# Patient Record
Sex: FEMALE | Race: BLACK OR AFRICAN AMERICAN | Employment: FULL TIME | URBAN - METROPOLITAN AREA
[De-identification: names, ages, dates, MRNs, and addresses within clinical notes are randomized per-mention and may not be internally consistent; named-entity substitution may affect disease eponyms.]

---

## 2022-12-19 ENCOUNTER — OFFICE VISIT (OUTPATIENT)
Dept: SURGERY | Age: 42
End: 2022-12-19
Payer: MEDICAID

## 2022-12-19 ENCOUNTER — HOSPITAL ENCOUNTER (OUTPATIENT)
Dept: LAB | Age: 42
Discharge: HOME | End: 2022-12-19

## 2022-12-19 VITALS
WEIGHT: 257.8 LBS | DIASTOLIC BLOOD PRESSURE: 76 MMHG | TEMPERATURE: 97.1 F | HEIGHT: 65 IN | BODY MASS INDEX: 42.95 KG/M2 | SYSTOLIC BLOOD PRESSURE: 132 MMHG | OXYGEN SATURATION: 100 % | HEART RATE: 78 BPM

## 2022-12-19 DIAGNOSIS — K21.9 GASTROESOPHAGEAL REFLUX DISEASE, UNSPECIFIED WHETHER ESOPHAGITIS PRESENT: ICD-10-CM

## 2022-12-19 DIAGNOSIS — I10 PRIMARY HYPERTENSION: Primary | ICD-10-CM

## 2022-12-19 DIAGNOSIS — M54.50 CHRONIC LOW BACK PAIN, UNSPECIFIED BACK PAIN LATERALITY, UNSPECIFIED WHETHER SCIATICA PRESENT: ICD-10-CM

## 2022-12-19 DIAGNOSIS — E66.01 MORBID OBESITY WITH BMI OF 40.0-44.9, ADULT (HCC): ICD-10-CM

## 2022-12-19 DIAGNOSIS — G89.29 CHRONIC LOW BACK PAIN, UNSPECIFIED BACK PAIN LATERALITY, UNSPECIFIED WHETHER SCIATICA PRESENT: ICD-10-CM

## 2022-12-19 DIAGNOSIS — M17.0 ARTHRITIS OF BOTH KNEES: ICD-10-CM

## 2022-12-19 DIAGNOSIS — E66.01 MORBID OBESITY (HCC): ICD-10-CM

## 2022-12-19 PROBLEM — M54.9 CHRONIC BACK PAIN: Status: ACTIVE | Noted: 2022-12-19

## 2022-12-19 LAB — SENTARA SPECIMEN COL,SENBCF: NORMAL

## 2022-12-19 PROCEDURE — 99001 SPECIMEN HANDLING PT-LAB: CPT

## 2022-12-19 PROCEDURE — 99204 OFFICE O/P NEW MOD 45 MIN: CPT | Performed by: SPECIALIST

## 2022-12-19 PROCEDURE — 3078F DIAST BP <80 MM HG: CPT | Performed by: SPECIALIST

## 2022-12-19 PROCEDURE — 3074F SYST BP LT 130 MM HG: CPT | Performed by: SPECIALIST

## 2022-12-19 RX ORDER — KETOCONAZOLE 20 MG/ML
SHAMPOO, SUSPENSION TOPICAL
COMMUNITY
Start: 2022-12-01

## 2022-12-19 RX ORDER — VENLAFAXINE HYDROCHLORIDE 75 MG/1
1 CAPSULE, EXTENDED RELEASE ORAL DAILY
COMMUNITY
Start: 2022-07-19

## 2022-12-19 RX ORDER — CHOLECALCIFEROL (VITAMIN D3) 125 MCG
1 CAPSULE ORAL DAILY
COMMUNITY
Start: 2022-06-16

## 2022-12-19 RX ORDER — NYSTATIN 100000 U/G
CREAM TOPICAL 2 TIMES DAILY
COMMUNITY
Start: 2022-10-02

## 2022-12-19 RX ORDER — CYCLOBENZAPRINE HCL 10 MG
TABLET ORAL
COMMUNITY
Start: 2022-12-01

## 2022-12-19 RX ORDER — TRIAMCINOLONE ACETONIDE 1 MG/G
CREAM TOPICAL
COMMUNITY
Start: 2022-12-01

## 2022-12-19 RX ORDER — TOPIRAMATE 50 MG/1
1 TABLET, FILM COATED ORAL 2 TIMES DAILY
COMMUNITY
Start: 2022-09-02

## 2022-12-19 RX ORDER — TRAZODONE HYDROCHLORIDE 50 MG/1
TABLET ORAL
COMMUNITY
Start: 2022-12-01

## 2022-12-19 RX ORDER — CETIRIZINE HYDROCHLORIDE 10 MG/1
1 TABLET ORAL DAILY
COMMUNITY
Start: 2022-06-16

## 2022-12-19 RX ORDER — FLUTICASONE PROPIONATE 50 MCG
1 SPRAY, SUSPENSION (ML) NASAL DAILY
COMMUNITY

## 2022-12-19 RX ORDER — FAMOTIDINE 20 MG/1
20 TABLET, FILM COATED ORAL DAILY
COMMUNITY
Start: 2022-10-02

## 2022-12-19 NOTE — PROGRESS NOTES
Bariatric Surgery Consultation    Subjective: The patient is a 38 y.o. obese female with a Body mass index is 42.9 kg/m². .  The patient is at her heaviest weight for the past 5 years. she has been overweight since age 37.   she has been considering surgery since last year. she desires surgery at this time because of multiple health concerns and their lifestyle issues which are hindered by their weight. she has been referred by his family physician Dr Keiry Ulloa for evaluation and treatment of their obesity via surgical intervention. Ana Juárez has tried multiple diets in her lifetime most recently tried physician supervised, behavior modification, unsupervised diets, Weight Watchers, and Atkins    Bariatric comorbidities present are   Patient Active Problem List   Diagnosis Code    Morbid obesity ([facility] E66.01    Morbid obesity with BMI of 40.0-44.9, adult ([facility] [address] E66.01, Z68.41    Hypertension I10    Chronic back pain M54.9, G89.29    Arthritis of both knees M17.0    GERD (gastroesophageal reflux disease) K21.9       The patient is considering laparoscopic sleeve gastrectomy for surgical weight loss due to their ineffective progress with medical forms of weight loss and the urging of their physician who cares for their primary medical issues. The patient  now presents  for consideration for weight loss surgery understanding the benefits of this over a medical approach of weight loss as was discussed in our presentation on weight loss surgery. They have discussed their plans both with their family and primary care physician who is in support of their pursuit of such. The patient has not had health issues as of late and denies and gastrointestinal disturbances other than what is outlined below in their review of symptoms. All of their prior evaluations available by both their PCP's and specialists physicians have been reviewed today either in the Care Everywhere portal or scanned under the media tab.     I have spent a large portion of my initial consultation today reviewing the patients current dietary habits which have contributed to their health issues and obesity. I have suggested to them personally a dietary regimen that they can initiate now to help with their status as it pertains to their weight. They understand that the most important aspect of their journey through their weight loss endeavor will be their adherence to a new lifestyle of healthy eating behavior. They also understand that an adherence to an exercise program will not only help with weight loss but is ultimately important in weight maintenance. The patients goal weight is 150lb. These goals are consistent with expected outcomes of their desired operation. her Medical goals are resolution of these health issues. Patient Active Problem List    Diagnosis Date Noted    Morbid obesity ([facility] [address] 12/19/2022    Morbid obesity with BMI of 40.0-44.9, adult ([facility] 12/19/2022    Hypertension 12/19/2022    Chronic back pain 12/19/2022    Arthritis of both knees 12/19/2022    GERD (gastroesophageal reflux disease) 12/19/2022     Past Surgical History:   Procedure Laterality Date    HX HERNIA REPAIR      ventral hernia repair without mesh      Social History     Tobacco Use    Smoking status: Never    Smokeless tobacco: Not on file   Substance Use Topics    Alcohol use: No      Family History   Problem Relation Age of Onset    Dementia Mother     Cancer Father     Diabetes Sister     Hypertension Sister       Current Outpatient Medications   Medication Sig Dispense Refill    cetirizine (ZYRTEC) 10 mg tablet Take 1 Tablet by mouth daily. cholecalciferol, vitamin D3, 50 mcg (2,000 unit) tab Take 1 Tablet by mouth daily. cyclobenzaprine (FLEXERIL) 10 mg tablet       famotidine (PEPCID) 20 mg tablet Take 20 mg by mouth daily. fluticasone propionate (FLONASE) 50 mcg/actuation nasal spray 1 Spray daily.       ketoconazole (NIZORAL) 2 % shampoo nystatin (MYCOSTATIN) topical cream Apply  to affected area two (2) times a day. topiramate (TOPAMAX) 50 mg tablet Take 1 Tablet by mouth two (2) times a day. traZODone (DESYREL) 50 mg tablet       triamcinolone acetonide (KENALOG) 0.1 % topical cream       venlafaxine-SR (EFFEXOR-XR) 75 mg capsule Take 1 Capsule by mouth daily. promethazine (PHENERGAN) 25 mg tablet Take 25 mg by mouth every six (6) hours as needed for Nausea. HYDROcodone-acetaminophen (NORCO) 5-325 mg per tablet Take 1 Tab by mouth every eight (8) hours as needed for Pain. Max Daily Amount: 3 Tabs. 15 Tab 0    ondansetron (ZOFRAN ODT) 4 mg disintegrating tablet Take 1 Tab by mouth every eight (8) hours as needed for Nausea.  10 Tab 0     No Known Allergies       Review of Systems:       General - No history or complaints of unexpected fever, chills, or weight loss  Head/Neck - No history or complaints of headache, diplopia, dysphagia, hearing loss  Cardiac - No history or complaints of chest pain, palpitations, murmur, or shortness of breath  Pulmonary - No history or complaints of shortness of breath, productive cough, hemoptysis  Gastrointestinal - minimal reflux,no  abdominal pain, obstipation/constipation or blood per rectum  Genitourinary - No history or complaints of hematuria/dysuria, stress urinary incontinence symptoms, or renal lithiasis  Musculoskeletal - moderate joint pain in their back and knees,  no muscular weakness  Hematologic - No history or complaints of bleeding disorders,  No blood transfusions  Neurologic - No history or complaints of  migraine headaches, seizure activity, syncopal episodes, TIA or stroke  Integumentary - No history or complaints of rashes, abnormal nevi, skin cancer  Gynecological - carlotta menses               Objective:   Visit Vitals  /76   Pulse 78   Temp 97.1 °F (36.2 °C)   Ht 5' 5\" (1.651 m)   Wt 116.9 kg (257 lb 12.8 oz)   SpO2 100%   BMI 42.90 kg/m²       Physical Examination: General appearance - alert, well appearing, and in no distress and oriented to person, place, and time  Mental status - alert, oriented to person, place, and time, normal mood, behavior, speech, dress, motor activity, and thought processes  Eyes - pupils equal and reactive, extraocular eye movements intact, sclera anicteric, left eye normal, right eye normal  Ears - right ear normal, left ear normal  Nose - normal and patent, no erythema, discharge or polyps  Mouth - mucous membranes moist, pharynx normal without lesions  Neck - supple, no significant adenopathy  Lymphatics - no palpable lymphadenopathy, no hepatosplenomegaly  Chest - clear to auscultation, no wheezes, rales or rhonchi, symmetric air entry  Heart - normal rate, regular rhythm, normal S1, S2, no murmurs, rubs, clicks or gallops  Abdomen - soft, nontender, nondistended, no masses or organomegaly  Back exam - full range of motion, no tenderness, palpable spasm or pain on motion  Neurological - alert, oriented, normal speech, no focal findings or movement disorder noted  Musculoskeletal - no joint tenderness, deformity or swelling  Extremities - peripheral pulses normal, no pedal edema, no clubbing or cyanosis    Labs:       No results found for this or any previous visit (from the past 1440 hour(s)). Assessment:     Morbid obesity with comorbidity    Plan:     laparoscopic sleeve gastrectomy possible gastric bypass    This is a 38 y.o. female with a BMI of Body mass index is 42.9 kg/m². and the weight-related co-morbidties as noted below. Karyn Gonzalez meets the NIH criteria for bariatric surgery based upon the BMI of Body mass index is 42.9 kg/m². and multiple weight-related co-morbidties. Karyn Gonzalez has elected laparoscopic sleeve gastrectomy as her intervention of choice for treatment of morbid obestiy through surgical means secondary to its safety profile, rapid return to work  and decreases in operative risks over gastric bypass.     In the office today, following Sugar's history and physical examination, a 30 minute discussion regarding the anatomic alterations for the laparoscopic sleeve gastrectomy was undertaken. The dietary expectations and the patient and physician dependent factors for success were thoroughly discussed, to include the need for interval follow-up and long-term dietary changes associated with success. The possible complications of the sleeve gastrectomy  were also discussed, to include;death, DVT/PE, staple line leak, bleeding, stricture formation, infection, nutritional deficiencies and sleeve dilation. Specific weight related outcomes for success were also discussed with an emphasis on careful and close follow-up with the first year and eating behavior modification as the baseline and cyclical hunger return. The patient expressed an understanding of the above factors, and her questions were answered in their entirety. In addition, the patient watched a seminar regarding obesity, surgical weight loss including, adjustable gastric band, gastric bypass, and sleeve gastrectomy. This discussion contrasted the different surgical techniques, mechanisms of actions and expected outcomes, and surgical and medical risks associated with each procedure. In office today we had a long question and answer session where each questions was answered until there were no additional questions. Today, the patient had all of her questions answered and desires to proceed with  bariatric surgery initially choosing sleeve gastrectomy as her surgical option. Secondary Diagnoses:     Dietary Intervention  - The patient is currently scheduled to see or has been followed by a bariatric nutritionist for an attempt at preoperative weight loss as has been dictated by their insurance carrier.   They will be assessed at various times during their follow up to evaluate their progress depending on the length of time that is required once again by their carrier. I have explained the importance of preoperative weight loss and the benefits regarding lower surgical risk and also assisting the patient in reaching their weight loss goal. They understand also that they should participate in an exercise program to assist in this weight loss. Finally they understand there is a physiologic benefit from the standpoint of hepatic volume reduction and reduction of central visceral adiposity preoperatively. I have reiterated the importance of a low carbohydrate and high protein regimen to achieve their stated goal. I have reviewed their current eating behavior prior to this encounter and explained to them in an exhaustive fashion the appropriate diet that they should adhere to. They have been encouraged to loose weight pre operatively and understand it is our prerogative to cancel surgery or postpone their procedure in the event of significant weight gain. GERD -The patient understands that weight loss surgery is not a guaranteed cure for reflux disease but does understand the benefits that weight loss can have on reflux disease. They also understand that at the time of surgery the gastroesophageal junction will be evaluated for the presence of a diaphragmatic hernia. Hernias will be corrected always with the surgical procedure if possible and is deemed safe. The patient also understands that neither weight loss surgery nor repair of a diaphragmatic hernia repair guarantees the complete cessation of the disease. They also understand there is a possibility of recurrence of these hernias with a simple crural repair as is performed with these procedures. They understand they may have to continue their medications in the postoperative period.  They have a good understanding that the gastric bypass procedure is better suited to total resolution of this issue and that neither the Lap Band or sleeve gastrectomy is considered a curative procedure as it pertains to this diagnosis. Hypertension - the patient understands, and we have discussed in detail, that this is an active acute health problem that has a multifactorial effect on their body from the standpoint of healing. They understand that uncontrolled hypertension affects other organ processes and this leads to risk associated with surgical procedures. They understand that in addition to hypertension, once they develop other health issues, their risk is exponentially worse. Currently they understand that this likely one of the single most important items that they need to control in the perioperative process. They understand that the hospitals protocol is that patients entering the operative suite should have a blood pressure reading less than 160/90 prior to surgery. Elevated readings today in office above 160/90 are recommended emergent PCP follow-up or if symptomatic to proceed to the ED. The patient has a clear understanding of how weight loss improves hypertension as a whole, but also they understand that there is a significant genetic component to this disease process. We will monitor the patients blood pressure while in the hospital and the plan would be to continue those medications postoperatively. If a diuretic is being used we will stop them on discharge to prevent dehydration particularly with the sleeve gastrectomy and the gastric bypass procedures. They will be instructed to monitor their blood pressure postoperatively while at home and notify their primary care physician in the event of any significantly high or uncharacteristic readings. Weight Related Arthritis -The patient understands the benefits that weight loss surgery can have on their arthritis but also understands that weight loss is not a guaranteed cure and relief of symptoms is often dependent on the severity of the underlying disease.   The patient also understands that traditional pharmaceutical treatments for this diagnosis are usually unavailable to post-operative weight loss patients due to the effects on the gastrointestinal tract particularly with the gastric bypass and to a lesser effect with the sleeve gastrectomy. Any changes to the patients medication treatment will ultimately be made the patients PCP with input by our office.      Signed By: Gwyndolyn Mohs, MD     December 19, 2022

## 2022-12-20 LAB
A-G RATIO,AGRAT: 1.1 RATIO (ref 1.1–2.6)
ABSOLUTE LYMPHOCYTE COUNT, 10803: 1.9 K/UL (ref 1–4.8)
ALBUMIN SERPL-MCNC: 3.9 G/DL (ref 3.5–5)
ALP SERPL-CCNC: 124 U/L (ref 25–115)
ALT SERPL-CCNC: 18 U/L (ref 5–40)
ANION GAP SERPL CALC-SCNC: 11 MMOL/L (ref 3–15)
AST SERPL W P-5'-P-CCNC: 22 U/L (ref 10–37)
AVG GLU, 10930: 125 MG/DL (ref 91–123)
BASOPHILS # BLD: 0.1 K/UL (ref 0–0.2)
BASOPHILS NFR BLD: 1 % (ref 0–2)
BILIRUB SERPL-MCNC: 0.1 MG/DL (ref 0.2–1.2)
BUN SERPL-MCNC: 12 MG/DL (ref 6–22)
CALCIUM SERPL-MCNC: 9.5 MG/DL (ref 8.4–10.5)
CHLORIDE SERPL-SCNC: 105 MMOL/L (ref 98–110)
CO2 SERPL-SCNC: 25 MMOL/L (ref 20–32)
CREAT SERPL-MCNC: 0.7 MG/DL (ref 0.5–1.2)
EOSINOPHIL # BLD: 0.2 K/UL (ref 0–0.5)
EOSINOPHIL NFR BLD: 3 % (ref 0–6)
ERYTHROCYTE [DISTWIDTH] IN BLOOD BY AUTOMATED COUNT: 16.6 % (ref 10–15.5)
FERRITIN SERPL-MCNC: 50 NG/ML (ref 10–291)
GLOBULIN,GLOB: 3.5 G/DL (ref 2–4)
GLOMERULAR FILTRATION RATE: >60 ML/MIN/1.73 SQ.M.
GLUCOSE SERPL-MCNC: 100 MG/DL (ref 70–99)
GRANULOCYTES,GRANS: 53 % (ref 40–75)
HBA1C MFR BLD HPLC: 6 % (ref 4.8–5.6)
HCT VFR BLD AUTO: 36.6 % (ref 35.1–46.5)
HGB BLD-MCNC: 10.3 G/DL (ref 11.7–15.5)
IRON,IRN: 46 MCG/DL (ref 30–160)
LYMPHOCYTES, LYMLT: 33 % (ref 20–45)
MCH RBC QN AUTO: 24 PG (ref 26–34)
MCHC RBC AUTO-ENTMCNC: 28 G/DL (ref 31–36)
MCV RBC AUTO: 84 FL (ref 80–99)
MONOCYTES # BLD: 0.6 K/UL (ref 0.1–1)
MONOCYTES NFR BLD: 10 % (ref 3–12)
NEUTROPHILS # BLD AUTO: 3 K/UL (ref 1.8–7.7)
PLATELET # BLD AUTO: 263 K/UL (ref 140–440)
PMV BLD AUTO: 10.9 FL (ref 9–13)
POTASSIUM SERPL-SCNC: 4.1 MMOL/L (ref 3.5–5.5)
PROT SERPL-MCNC: 7.4 G/DL (ref 6.4–8.3)
RBC # BLD AUTO: 4.36 M/UL (ref 3.8–5.2)
SODIUM SERPL-SCNC: 141 MMOL/L (ref 133–145)
T4 FREE SERPL-MCNC: 0.9 NG/DL (ref 0.9–1.8)
TSH SERPL DL<=0.005 MIU/L-ACNC: 1.07 MCU/ML (ref 0.27–4.2)
WBC # BLD AUTO: 5.7 K/UL (ref 4–11)

## 2023-01-11 ENCOUNTER — HOSPITAL ENCOUNTER (OUTPATIENT)
Dept: BARIATRICS/WEIGHT MGMT | Age: 43
Discharge: HOME | End: 2023-01-11

## 2023-01-11 ENCOUNTER — HOSPITAL ENCOUNTER (OUTPATIENT)
Age: 43
Setting detail: OUTPATIENT SURGERY
Discharge: HOME | End: 2023-01-11
Attending: SPECIALIST | Admitting: SPECIALIST
Payer: MEDICAID

## 2023-01-11 ENCOUNTER — APPOINTMENT (OUTPATIENT)
Dept: GENERAL RADIOLOGY | Age: 43
End: 2023-01-11
Attending: SPECIALIST
Payer: MEDICAID

## 2023-01-11 VITALS
RESPIRATION RATE: 18 BRPM | BODY MASS INDEX: 42.57 KG/M2 | OXYGEN SATURATION: 99 % | HEART RATE: 62 BPM | SYSTOLIC BLOOD PRESSURE: 119 MMHG | HEIGHT: 65 IN | DIASTOLIC BLOOD PRESSURE: 66 MMHG | WEIGHT: 255.5 LBS | TEMPERATURE: 98.8 F

## 2023-01-11 VITALS — BODY MASS INDEX: 42.38 KG/M2 | HEIGHT: 65 IN | WEIGHT: 254.4 LBS

## 2023-01-11 DIAGNOSIS — K21.9 GASTROESOPHAGEAL REFLUX DISEASE, UNSPECIFIED WHETHER ESOPHAGITIS PRESENT: Primary | ICD-10-CM

## 2023-01-11 DIAGNOSIS — E66.01 MORBID OBESITY (HCC): ICD-10-CM

## 2023-01-11 PROCEDURE — 74240 X-RAY XM UPR GI TRC 1CNTRST: CPT

## 2023-01-11 PROCEDURE — 74011000250 HC RX REV CODE- 250: Performed by: SPECIALIST

## 2023-01-11 PROCEDURE — 76040000019: Performed by: SPECIALIST

## 2023-01-11 NOTE — PROCEDURES
Patient:Sugar Pelaez   : 1980  Medical Record [id number]    PREPROCEDURE DIAGNOSIS: This patient is preoperative for laparoscopic sleeve gastrectomy procedure with a history of  reflux disease. POSTPROCEDURE DIAGNOSIS: This patient is preoperative for laparoscopic sleeve gastrectomy procedure with a history of  reflux disease. PROCEDURES PERFORMED: Upper GI study with barium. ESTIMATED BLOOD LOSS: None. SPECIMENS: None. STATEMENT OF MEDICAL NECESSITY: The patient is a patient with a  longstanding history of obesity. They are now considering the laparoscopic sleeve gastrectomy procedure as a means of surgical weight control and due to their history of reflux disease and are being assessed preoperatively for such. DESCRIPTION OF PROCEDURE: The patient was brought to the fluoroscopy unit and  was given thin barium. On swallowing of barium, they were noted to have  normal peristalsis of their esophagus. They had prompt filling of distal  esophagus with tapering into the gastroesophageal junction. There was no evidence of a hiatal hernia present. Contrast then filled the gastric cardia, fundus,body and pre pyloric region with no abnormalities noted. Contrast then exited the pylorus in normal fashion. No obstruction was noted. There was no evidence of reflux noted.     (normal anatomy)    Den Morfin MD

## 2023-01-11 NOTE — PROGRESS NOTES
Medical Weight Loss Multi-Disciplinary Program    Patient's Name: Rogelio Howard Age: 38 y.o. YOB: 1980 Sex: female     Session #{NUMBERS [id number]:[id number]}. Pt attended in-person class. Weight obtained in office. Date: 1/11/2023    Visit Vitals   5' 5\" (1.651 m)   Wt 115.4 kg (254 lb 6.4 oz)   BMI 42.33 kg/m²       Pounds Lost since last month: N/A Pounds Gained since last month: N/A       Starting Weight (Initial Consult/Seminar***, ***: *** lbs Previous Months Weight: N/A   Overall Pounds Lost: *** lbs  Overall Pounds Gained: *** lbs     Do you smoke? ***    Alcohol intake:  Number of drinks per week: ***    Class Guidelines    Guidelines are reviewed with patient at the start of every class. 1. Patient understands that weight loss trial classes must be consecutive. Patient understands if they miss a class, it is their responsibility to contact me to reschedule class. I will reach out to patient after their first no show. 2.  Patient understands the expectations that weight maintenance/weight loss is expected during the classes. Failure to demonstrate changes may result in extension of weight loss trial, followed by returning to see the surgeon. Patient understands that they CANNOT gain any weight during the weight loss trial.  Gaining weight will result in extension of weight loss trial.  3. Patient is also instructed to complete their labwork, psychological evaluation visit, and any other tests that the surgeon has ordered while they are working on their weight loss trial.  4.  Patient was instructed to bring their packet of nutrition education materials to every class and appointment. Eating Habits and Behaviors    Reviewed intake  Understanding low-carbohydrate/low-sugar/low-fat, higher protein meals  Instruction given for personal dietary changes  Discussed perceived compliance    Comments: RD Reviewed Diet History and Physical Activity/Exercise habits.   Recommended dietary changes discussed for both before and after surgery. Today in class we reviewed the Key Diet Principles. Patient was encouraged to consume 3 meals each day, and meal timing was reviewed. Meal time behaviors that will help pt to be successful with their weight loss efforts were also discussed. We discussed the importance of drinking adequate amounts of fluids, recommending that patient consume a minimum of 64 oz of sugar-free, caffeine-free and carbonation-free fluids each day. Patient was encouraged to eliminate sugar-sweetened beverages such as sweet tea, fruit juice, fruit smoothies, flavored coffee drinks and regular sodas. During the weight loss trial, patient is encouraged to focus on eating protein-forward meals, with a daily goal of  grams protein. Patient was also advised to decrease carbohydrate intake to <100 g/d, choosing complex vs. simple carbohydrates in limited amounts. We also discussed limiting fat intake. Encouraged patient to follow the \"3-gram rule\" when choosing foods by looking for items containing <3 g of fat and sugar per serving. We reviewed meal planning guidelines and discussed appropriate meal and snack options. We also talked about appropriate protein drinks and patient was encouraged to start trying these, using them either for a meal replacement or a protein-rich snack pre-operatively. We reviewed the nutritional guidelines for selecting protein shakes. Pre-operative vitamin and mineral supplementation was reviewed. Patient was instructed to choose a chewable complete multivitamin with iron in NON-gummy form. Selection of probiotic was also reviewed. We also talked about dining out after bariatric surgery. Patient was instructed on:   Following the Key Diet Principles to stay within the bariatric dietary guidelines  Key words to look for in menu item descriptions in order to make healthier choices  Requesting specific substitutions to allow meals to fit in with bariatric dietary guidelines  Using the restaurant card to request smaller portions of menu items or ordering from children's/senior's menu    Patient's current diet habits include:  Patient is eating *** meals and *** snacks per day. Per dietary recall, pts diet has the following concerns:  Pt has found *** protein supplement shakes for use post-op in meeting their protein needs. ***     Patient's plan for dietary and/or behavior changes in the upcoming month: ***    Physical Activity/Exercise    Comments: We talked about exercise. Patient was given reasons of why exercise is so important and how that can help with their long-term success. I have encouraged patient to get a support system to help with the activity. We talked about recommended types of physical activity, including walking, swimming, cycling, or chair exercises. I also talked with patient about doing some strength training, which helps the metabolism, as well as strengthen muscles and bones. Patient's plan to incorporate more activity includes: ***      Behavior Modification     Comments:  During today's class, we discussed important behavior changes relating to dining away from home that will help the patient be successful in meeting their weight loss goals and improving their health including:   The need to limit the frequency of dining away from home, especially fast food and convenience food options, in order to limit intake of calories, fats, and carbohydrates  The importance of keeping a positive mindset by focusing on the occasion and enjoying the company rather than the foods they can't have and the need for reduced portion sizes after surgery  Avoiding starters such as appetizers, breads, bar snacks, soups, and salads  Researching to find restaurants that offer healthier food options and reviewing menus/nutrition facts online to have a plan of what to order before dining out    Class participants watched a video of a former patient's success story, where personal accountability and encouragement to make the appropriate lifestyle changes for improved of quality of life were discussed. Goals: Work to increase to 3-4 small meals per day, with 1-2 planned snacks as needed. Recommend following the plate method for meal planning - focusing on lean protein, non-starchy vegetables, and measured amounts of complex carbohydrates. - Goal of  g protein and <100 g carbohydrates per day. - Select at least 2 DIFFERENT protein supplements that can be used for protein supplementation to meet goals pre- and post-operatively. - Practice Carbohydrate Counting and label reading.   - Follow 3 g rule for fat and sugar. 2. Slow down meals  - Chew each bite 25-35 times. - Aim for 20-30 min/meal.  3. Increase non-caloric fluids to 64 oz per day. Eliminate caffeine, added sugar, carbonation, and straws.               - Continue to work to decrease sugar sweetened beverages - goal of calorie-free beverages only. - Must eliminate caffeine prior to surgery and avoid for ~6-8 weeks. - Practice 30:30 rule,  food and fluid intake. 4. Start activity regimen, work to increase ADLs. 5. Start Complete MVI and probiotic at least 30 days pre-op. Candidate for surgery (per RD): PENDING, Pt scheduled for nutrition education on ***.

## 2023-02-07 ENCOUNTER — HOSPITAL ENCOUNTER (OUTPATIENT)
Dept: BARIATRICS/WEIGHT MGMT | Age: 43
Discharge: HOME | End: 2023-02-07

## 2023-02-07 VITALS — HEIGHT: 65 IN | BODY MASS INDEX: 42.28 KG/M2 | WEIGHT: 253.8 LBS

## 2023-02-07 NOTE — PROGRESS NOTES
Medical Weight Loss Multi-Disciplinary Program    Patient's Name: Deepak Bowers Age: 38 y.o. YOB: 1980 Sex: female     Session #2. Pt attended in-person class. Weight obtained in office. Date: 2/7/2023    Visit Vitals  Ht 5' 5\" (1.651 m)   Wt 115.1 kg (253 lb 12.8 oz)   BMI 42.23 kg/m²       Pounds Lost since last month: 0.6 lbs Pounds Gained since last month: 0 lbs       Starting Weight: 257.8 lbs Previous Months Weight: 254.4 lbs   Overall Pounds Lost: 4 lbs  Overall Pounds Gained: 0 lbs       Do you smoke? no    Alcohol intake:  Number of drinks per week:  0    Class Guidelines    Guidelines are reviewed with patient at the start of every class. 1. Patient understands that weight loss trial classes must be consecutive. Patient understands if they miss a class, it is their responsibility to contact me to reschedule class. I will reach out to patient after their first no show. 2.  Patient understands the expectations that weight maintenance/weight loss is expected during the classes. Failure to demonstrate changes may result in extension of weight loss trial, followed by returning to see the surgeon. Patient understands that they CANNOT gain any weight during the weight loss trial.  Gaining weight will result in extension of weight loss trial.  3. Patient is also instructed to complete their labwork, psychological evaluation visit, and any other tests that the surgeon has ordered while they are working on their weight loss trial.  4.  Patient was instructed to bring their packet of nutrition education materials to every class and appointment. Eating Habits and Behaviors    Reviewed intake  Understanding low carbohydrates, low sugar, higher protein meals  Instruction given for personal dietary changes  Discussed perceived compliance    Comments: RD Reviewed Diet History and Physical Activity/Exercise habits.   Recommended dietary changes discussed for both before and after surgery. Today in class we reviewed the Key Diet Principles. Patient was encouraged to consume 3 meals each day, and meal timing was reviewed. Meal time behaviors that will help pt to be successful with their weight loss efforts were also discussed. We discussed the importance of drinking adequate amounts of fluids, recommending that patient consume a minimum of 64 oz of sugar-free, caffeine-free and carbonation-free fluids each day. Patient was encouraged to eliminate sugar-sweetened beverages such as sweet tea, fruit juice, fruit smoothies, flavored coffee drinks and regular sodas. During the weight loss trial, patient is encouraged to focus on eating protein-forward meals, with a daily goal of  grams protein. Patient was also advised to decrease carbohydrate intake to <100 g/d, choosing complex vs. simple carbohydrates in limited amounts. We also discussed limiting fat intake. Encouraged patient to follow the \"3-gram rule\" when choosing foods by looking for items containing <3 g of fat and sugar per serving. We reviewed meal planning guidelines and discussed appropriate meal and snack options. We also talked about appropriate protein drinks and patient was encouraged to start trying these, using them either for a meal replacement or a protein-rich snack pre-operatively. We reviewed the nutritional guidelines for selecting protein shakes. Pre-operative vitamin and mineral supplementation was reviewed. Patient was instructed to choose a chewable complete multivitamin with iron in NON-gummy form. Selection of probiotic was also reviewed. Comments: During today's class we talked about setting SMART goals. Patient was instructed on: The Stages of Readiness to Change  Goals for each stage of readiness to change. Key words associated with each stage of readiness to change. Examples of aspects of pts diet to focus on and how to tailor the patients goals in these areas.   Patient was given the guidelines of how to set SMART goals. Pt was given examples of barriers to change and ways to persist in the face of barriers. Patient's current diet habits include:  Patient is eating 3 meals and 0-1 snacks per day. Per dietary recall, pts diet has the following concerns:  Pt has found 0 protein supplement shakes for use post-op in meeting their protein needs. Pt is consuming foods high in carbohydrates, such as: cereal, rice, ice cream.  Pt is consuming foods high in fat such as: ice cream.  Pt is consuming 1 sugar-sweetened beverage daily. Patient's plan for dietary and/or behavior changes in the upcoming month: none noted    Physical Activity/Exercise    Comments: We talked about exercise. Patient was given reasons of why exercise is so important and how that can help with their long-term success. I have encouraged patient to get a support system to help with the activity. We talked about recommended types of physical activity, including walking, swimming, cycling, or chair exercises. I also talked with patient about doing some strength training, which helps the metabolism, as well as strengthen muscles and bones. Patient's plan to incorporate more activity includes: \"walking\". Behavior Modification     Comments:  During today's class, I discussed vitamin and mineral supplementation essential for health and prevention of malnutrition in depth. Patient was directed to the handouts on vitamins and minerals found on pages 27-33 that were provided in class handouts packet as well as a handout titled, \"Nutrient Deficiency and it's symptoms\". I reviewed the vitamins and minerals that need to be supplemented, dosage recommendations, functions of supplements and signs of deficiencies, as well as examples of specific supplements.   Reviewed briefly the requirement  differences between laparoscopic gastric bypass, laparoscopic sleeve gastrectomy, and lap-band procedures, while encouraging all patients to continue supplements for life no matter what bariatric surgery they are preparing for. Goals: Work to increase to 3-4 small meals per day, with 1-2 planned snacks as needed. Recommend following the plate method for meal planning - focusing on lean protein, non-starchy vegetables, and measured amounts of complex carbohydrates. - Goal of  g protein and <100 g carbohydrates per day. - Select at least 2 DIFFERENT protein supplements that can be used for protein supplementation to meet goals pre- and post-operatively. - Practice Carbohydrate Counting and label reading.   - Follow 3 g rule for fat and sugar. 2. Slow down meals  - Chew each bite 25-35 times. - Aim for 20-30 min/meal.  3. Increase non-caloric fluids to 64 oz per day. Eliminate caffeine, added sugar, carbonation, and straws.               - Continue to work to decrease sugar sweetened beverages - goal of calorie-free beverages only. - Must eliminate caffeine prior to surgery and avoid for ~6-8 weeks. - Practice 30:30 rule,  food and fluid intake. 4. Start activity regimen, work to increase ADLs. 5. Start Complete MVI and probiotic at least 30 days pre-op. Candidate for surgery (per RD): PENDING, Pt scheduled for nutrition education on 3/7/23.

## 2023-03-07 ENCOUNTER — HOSPITAL ENCOUNTER (OUTPATIENT)
Facility: HOSPITAL | Age: 43
Discharge: HOME | End: 2023-03-10

## 2023-03-07 VITALS — BODY MASS INDEX: 42.84 KG/M2 | HEIGHT: 65 IN | WEIGHT: 257.1 LBS

## 2023-03-07 NOTE — PROGRESS NOTES
Medical Weight Loss Multi-Disciplinary Program    Patient's Name: Nathen Christine  Age: 38 y.o. YOB: 1980   Sex: female    Session# 3 . Pt attended in-person class. Weight obtained in office. Date: 3/7/2023    Vitals:    03/07/23 1545   Weight: 257 lb 1.6 oz (116.6 kg)   Height: 5' 5\" (1.651 m)      Body mass index is 42.78 kg/m². Pounds Lost since last month: 0 lbs        Pounds Gained since last month: 3.3 lbs    Starting Weight: 257.8 lbs    Previous Months Weight: 253.8 lbs  Overall Pounds Lost: 0.7 lbs    Overall Pounds Gained: 0 lbs    Do you smoke? Yes, \"weed sometimes for pain\"    Alcohol intake:  Number of drinks  per week: 0    Class Guidelines    Guidelines are reviewed with patient at the start of every class. 1. Patient understands that weight loss trial classes must be consecutive. Patient understands if they miss a class, it is their responsibility to contact me to reschedule class. I will reach out to patient after their first no show. 2.  Patient understands the expectations that weight maintenance/weight loss is expected during the classes. Failure to demonstrate changes may result in one extra month of weight loss trial, followed by going back to see the surgeon. Patient understands that they CAN NOT gain any weight during the weight loss trial.  Gaining weight will result in extra classes. 3. Patient is also instructed to be doing their labs, blood work, psych visit, support group and any other test that the surgeon has used while they are working on their weight loss trial.  4.  Patient was instructed to bring their blue folder to every class and appointment. Eating Habits and Behaviors    Today in class, we reviewed the key diet principles. Pt was encouraged to consume 3 meals each day; the timing the meals was reviewed. Meal time behaviors that will help pt to be successful with their weight loss efforts were additionally reviewed.      RD discussed the importance of adequate fluids, recommending that pt consume a minimum of 64 oz of sugar-free, caffeine-free and carbonation-free fluids each day. Patient was encouraged to cut out liquid calories, such as soda and sweet tea. In class, we also talked about focusing on protein and low carbohydrates. Patient was encouraged during the weight loss trial to keep their carbohydrate less than 100 grams per day and their protein level at  grams per day. We talked about meal choices and snack ideas. Pre-operative vitamin and mineral supplementation was reviewed. Pts were instructed to choose chewable complete multivitamin with iron in NON-gummy form. Selection of probiotic was explained. Patient was directed to the section on label reading in their blue folder. This section will assist them when planning meals/grocery shopping to pick more appropriate options. Patient's current diet habits include:  Patient is eating 3 meals and 3 snacks per day. Per dietary recall, pts diet has the following concerns:  Pt has found 0 protein supplement shakes for use post-op in meeting their protein needs. Pt is consuming foods high in carbohydrates, such as: chips, bread, rice. Pt is consuming foods high in fat such as: Atkins protein shake, chips. Patient's plan for dietary and/or behavior changes in the upcoming month: none noted        Physical Activity/Exercise    Comments: We talked about exercise. Patient was given reasons of why exercise is so important and how that can help with their long-term success. I have encouraged patient to get a support system to help with the activity. Recommended that pt aim for 150 min/wk vigorous physical activity. We talked about recommended types of physical activity, including walking, swimming, cycling, or chair exercises.   I also talked with patient about doing some strength training, which helps the metabolism, as well as strengthen muscles and bones. Patient's plan to incorporate more activity includes: \" my walking\"      Behavior Modification       Comments:  During today's lesson, I also spent some time talking about behavior changes. I talked to patients about selection of foods using the food label. Reviewed the different parts of the Food Label, and parts to focus on while selecting foods. In depth discussion was provided and goals reviewed for: protein, fat and carbohydrates using the serving size, as well as ingredients to watch out for in the ingredients list when selecting bariatric-friendly food options. Pt was encouraged to follow the 3 gram rule. In depth instruction was provided over the components of the nutrition label to assist pts in feeling confident when selecting foods. The food exceptions to the label rules were explained in detail. Also reviewed, were label claims and what these mean to the patient selecting food both pre- and post-operatively. Goals: Work to increase to 3-4 small meals per day, with 1-2 planned snacks as needed. Recommend following plate method for meal planning - focusing on lean protein, non-starchy vegetables, and measured amounts of starch. - Goal of  g protein and  g carbohydrate per day. - Select at least 2 DIFFERENT protein supplements that can be used for protein supplementation to meet goals pre- and post-operatively. -Practice Carbohydrate Counting and label reading   -Follow 3 g rule for fat and sugar. 2. Slow down meals  - Chew each bite 25-35 times. -Aim for 20-30 min/meal.  3. Increase non caloric fluid to 64 oz per day. Eliminate caffeine, added sugar, carbonation, and straws.               -Continue to work to decrease sugar sweetened beverages - goal of calorie free beverages only              -Must eliminate caffeine prior to surgery and avoid for ~6-8 weeks   -Practice 30:30 rule,  food and flood   4.  Start activity regimen, work to increase ADL  5. Start Complete MVI and probiotic at least 30 days pre-op. Candidate for surgery (per RD): PENDING, Pt scheduled for nutrition education on 4/4/23.

## 2023-04-04 ENCOUNTER — HOSPITAL ENCOUNTER (OUTPATIENT)
Facility: HOSPITAL | Age: 43
Discharge: HOME | End: 2023-04-07

## 2023-04-04 VITALS — BODY MASS INDEX: 43.39 KG/M2 | WEIGHT: 260.4 LBS | HEIGHT: 65 IN

## 2023-04-04 NOTE — PROGRESS NOTES
reading.   - Follow 3 g rule for fat and sugar. 2. Slow down meals  - Chew each bite 25-35 times. - Aim for 20-30 min/meal.  3. Increase non-caloric fluids to 64 oz per day. Eliminate caffeine, added sugar, carbonation, and straws.               - Continue to work to decrease sugar sweetened beverages - goal of calorie-free beverages only. - Must eliminate caffeine prior to surgery and avoid for ~6-8 weeks. - Practice 30:30 rule,  food and fluid intake. 4. Start activity regimen, work to increase ADLs. 5. Start Complete MVI and probiotic at least 30 days pre-op. Candidate for surgery (per RD): PENDING, Pt scheduled for nutrition education on 5/2/23.

## 2023-04-19 ENCOUNTER — HOSPITAL ENCOUNTER (OUTPATIENT)
Facility: HOSPITAL | Age: 43
Discharge: HOME | End: 2023-04-22
Payer: COMMERCIAL

## 2023-04-19 ENCOUNTER — HOSPITAL ENCOUNTER (OUTPATIENT)
Facility: HOSPITAL | Age: 43
Discharge: HOME | End: 2023-04-22

## 2023-04-19 VITALS
BODY MASS INDEX: 42.65 KG/M2 | DIASTOLIC BLOOD PRESSURE: 96 MMHG | OXYGEN SATURATION: 100 % | RESPIRATION RATE: 18 BRPM | HEIGHT: 65 IN | WEIGHT: 256 LBS | TEMPERATURE: 97.5 F | SYSTOLIC BLOOD PRESSURE: 145 MMHG | HEART RATE: 81 BPM

## 2023-04-19 DIAGNOSIS — E66.01 MORBID OBESITY (HCC): ICD-10-CM

## 2023-04-19 DIAGNOSIS — D64.9 ANEMIA, UNSPECIFIED TYPE: Primary | ICD-10-CM

## 2023-04-19 LAB — SENTARA SPECIMEN COLLECTION: NORMAL

## 2023-04-19 PROCEDURE — 74240 X-RAY XM UPR GI TRC 1CNTRST: CPT

## 2023-04-19 PROCEDURE — 99001 SPECIMEN HANDLING PT-LAB: CPT

## 2023-04-19 NOTE — PROGRESS NOTES
[facility] UGI FOCUS NOTE      Date:  4/19/2023  Time:  1:19 PM    Patient:  Early Kil  Procedure:  UGI      Patient Questions  Lap Band Adjustment Patient Questionnaire: If female, are you pregnant?  []Yes     [x]No  Tolerates thick liquids:  []Well   [x]1     []2     []3     []4     []5     []Poorly  Tolerates red meat:  []Well   []1     []2     []3     []4     []5     [] Poorly  Tolerates chicken:  []Well   [x]1     []2     []3     []4     []5     []Poorly  Tolerates fish:   []Well   [x]1     []2     []3     []4     []5     []Poorly  Hunger is:   []Well Controlled     [x]1     []2     []3     []4     []5      [] Poorly Controlled  Nightime regurgitation:  []Never     [x]1     []2     []3     []4     []5     []Frequently    Lap Band Info:  Band Type  []Realize     []Realize-C     []AP     []VG     []10cm     []Unknown  []Other      Comments:        Logan Burgos RN

## 2023-04-20 LAB
HCT VFR BLD CALC: 36.4 % (ref 35.1–46.5)
HEMOGLOBIN: 10.9 G/DL (ref 11.7–15.5)
MCH RBC QN AUTO: 25 PG (ref 26–34)
MCHC RBC AUTO-ENTMCNC: 30 G/DL (ref 31–36)
MCV RBC AUTO: 83 FL (ref 80–99)
PDW BLD-RTO: 16.3 % (ref 10–15.5)
PLATELET # BLD: 245 K/UL (ref 140–440)
PMV BLD AUTO: 10.7 FL (ref 9–13)
RBC: 4.39 M/UL (ref 3.8–5.2)
WBC: 8.3 K/UL (ref 4–11)

## 2023-05-02 ENCOUNTER — HOSPITAL ENCOUNTER (OUTPATIENT)
Facility: HOSPITAL | Age: 43
Discharge: HOME | End: 2023-05-05

## 2023-05-02 VITALS — BODY MASS INDEX: 42.87 KG/M2 | HEIGHT: 65 IN | WEIGHT: 257.3 LBS

## 2023-05-02 NOTE — PROGRESS NOTES
Medical Weight Loss Multi-Disciplinary Program    Patient's Name: Kimberlee Branch    Age: 38 y.o. YOB: 1980     Sex: female    Session #5. Pt attended in-person class. Weight obtained in office. Date: 5/2/2023     Vitals:    05/02/23 1230   Weight: 257 lb 4.8 oz (116.7 kg)   Height: 5' 5\" (1.651 m)      Body mass index is 42.82 kg/m². Pounds Lost since last month: 3.1 lbs       Pounds Gained since last month: 0 lbs    Starting Weight: 257.8 lbs    Previous Months Weight: 260.4 lbs  Overall Pounds Lost: 0.5 lbs   Overall Pounds Gained: 0 lbs    Do you smoke? no    Alcohol intake:  Number of drinks per week: 0    Class Guidelines    Guidelines are reviewed with patient at the start of every class. 1. Patient understands that weight loss trial classes must be consecutive. Patient understands if they miss a class, it is their responsibility to contact me to reschedule class. I will reach out to patient after their first no show. 2.  Patient understands the expectations that weight maintenance/weight loss is expected during the classes. Failure to demonstrate changes may result in extension of weight loss trial, followed by returning to see the surgeon. Patient understands that they CANNOT gain any weight during the weight loss trial.  Gaining weight will result in extension of weight loss trial.  3. Patient is also instructed to complete their labwork, psychological evaluation visit, and any other tests that the surgeon has used while they are working on their weight loss trial.  4.  Patient was instructed to bring their packet of nutrition education materials to every class and appointment. Eating Habits and Behaviors    Today in class we reviewed the Key Diet Principles. Patient was encouraged to consume 3 meals each day, and the timing the meals was reviewed. Meal time behaviors that will help pt to be successful with their weight loss efforts were additionally reviewed.

## 2023-05-19 ENCOUNTER — OFFICE VISIT (OUTPATIENT)
Age: 43
End: 2023-05-19
Payer: COMMERCIAL

## 2023-05-19 VITALS
TEMPERATURE: 97.1 F | HEIGHT: 65 IN | HEART RATE: 73 BPM | BODY MASS INDEX: 42.72 KG/M2 | WEIGHT: 256.4 LBS | SYSTOLIC BLOOD PRESSURE: 126 MMHG | OXYGEN SATURATION: 100 % | DIASTOLIC BLOOD PRESSURE: 78 MMHG

## 2023-05-19 DIAGNOSIS — D64.9 ANEMIA, UNSPECIFIED TYPE: ICD-10-CM

## 2023-05-19 DIAGNOSIS — M17.0 ARTHRITIS OF BOTH KNEES: ICD-10-CM

## 2023-05-19 DIAGNOSIS — E66.01 MORBID OBESITY WITH BMI OF 40.0-44.9, ADULT (HCC): ICD-10-CM

## 2023-05-19 DIAGNOSIS — E66.01 MORBID OBESITY (HCC): Primary | ICD-10-CM

## 2023-05-19 DIAGNOSIS — I10 HYPERTENSION, UNSPECIFIED TYPE: ICD-10-CM

## 2023-05-19 DIAGNOSIS — K21.9 GASTROESOPHAGEAL REFLUX DISEASE, UNSPECIFIED WHETHER ESOPHAGITIS PRESENT: ICD-10-CM

## 2023-05-19 PROBLEM — N92.0 HEAVY MENSES: Status: ACTIVE | Noted: 2023-05-19

## 2023-05-19 PROCEDURE — 3074F SYST BP LT 130 MM HG: CPT | Performed by: NURSE PRACTITIONER

## 2023-05-19 PROCEDURE — 3078F DIAST BP <80 MM HG: CPT | Performed by: NURSE PRACTITIONER

## 2023-05-19 PROCEDURE — 99214 OFFICE O/P EST MOD 30 MIN: CPT | Performed by: NURSE PRACTITIONER

## 2023-05-19 NOTE — PROGRESS NOTES
visit for anemia due to heavy menses. Has been recommended to speak with OB/GYN about plan to deal with as has worsened since she stopped receiving Depo injections. Has appointment with them in June. Secondary Diagnoses:     Dietary Intervention  - The patient is currently followed by a bariatric nutritionist for an attempt at preoperative weight loss as has been dictated by their insurance carrier. They will be assessed at various times during their follow up to evaluate their progress depending on the length of time that is required once again by their carrier. I have explained the importance of preoperative weight loss and the benefits regarding lower surgical risk and also assisting the patient in reaching their weight loss goal.  Finally they understand there is a physiologic benefit from the standpoint of hepatic volume reduction preoperatively. I have reiterated the importance of a low carbohydrate and high protein regimen to achieve their stated goal.    GERD -The patient understands that weight loss surgery is not a guaranteed cure for reflux disease but does understand the benefits that weight loss can have on reflux disease. They also understand that at the time of surgery the gastroesophageal junction will be evaluated for the presence of a diaphragmatic hernia. Hernias will be corrected always with the surgical procedure if possible and is deemed safe. The patient also understands that neither weight loss surgery nor repair of a diaphragmatic hernia repair guarantees the complete cessation of the disease. They also understand there is a possibility of recurrence of these hernias with a simple crural repair as is performed with these procedures. They understand they may have to continue their medications in the postoperative period.  They have a good understanding that the gastric bypass procedure is better suited to total resolution of this issue and that neither the Lap Band or sleeve gastrectomy

## 2023-05-19 NOTE — PATIENT INSTRUCTIONS
Patient Instructions      Remember hydration goals - minimum of 64 ounces of liquids per day (dehydration is the number one reason for hospital readmission). Continue to monitor carbohydrate and protein intake you need a minimum of  Grams of protein daily- remember to keep your total carbohydrates to 50 grams or less per day for best results. Continue to work towards exercise goals - 60-90 minutes, 5 times a week minimum of deliberate, aerobic exercise is the ultimate goal with strength training 2 times each week. Refer to [employer] for  information. Remember to take vitamins as directed. Attend support group the 2nd Thursday of each month. 6.  Constipation: Milk of Magnesia is for immediate relief only. Miralax is to be used every day if constipation is a chronic problem. 7.  Diarrhea: patients will occasionally develop lactose intolerance after surgery. Check to see if your protein shake has whey in it. If it does try a protein powder or drink that does not have whey and stop all yogurts, cheeses and milks to see if the diarrhea goes away. 8.  If you have had labs drawn. We will only call you if you have abnormal results. Otherwise you can access the lab results in \"[email or web address]\". You will only need the access code the first time you sign on. 9.  Call us at [phone] or email us through [email or web address] with questions,     concerns or worsening of condition, we have someone on call 24 hours a day. If you are unable to reach our office, you are to go to your Primary Care Physician or the Emergency Department.      NOTE TO GASTRIC BYPASS PATIENTS:  (SAME APPLIES TO GASTRIC SLEEVE PATIENTS FOR FIRST TWO MONTHS)  Remember that for the rest of your life, you are not able to take the following:  - NSAIDs (ibuprofen, goody powder, BC powder, Motrin, Advil, Mobic, Voltaren, Excedrin, etc.)  - Steroid pills or injections  - Smoke (cigarettes or recreational drugs)  - Alcohol  Use of

## 2023-06-01 ENCOUNTER — HOSPITAL ENCOUNTER (OUTPATIENT)
Facility: HOSPITAL | Age: 43
Discharge: HOME | End: 2023-06-04

## 2023-06-02 VITALS — WEIGHT: 259 LBS | HEIGHT: 65 IN | BODY MASS INDEX: 43.15 KG/M2

## 2023-06-21 ENCOUNTER — OFFICE VISIT (OUTPATIENT)
Age: 43
End: 2023-06-21
Payer: COMMERCIAL

## 2023-06-21 VITALS
DIASTOLIC BLOOD PRESSURE: 75 MMHG | BODY MASS INDEX: 42.07 KG/M2 | HEART RATE: 83 BPM | OXYGEN SATURATION: 100 % | WEIGHT: 252.5 LBS | RESPIRATION RATE: 16 BRPM | TEMPERATURE: 97.3 F | HEIGHT: 65 IN | SYSTOLIC BLOOD PRESSURE: 127 MMHG

## 2023-06-21 DIAGNOSIS — F12.90 MARIJUANA USE: ICD-10-CM

## 2023-06-21 DIAGNOSIS — M17.0 ARTHRITIS OF BOTH KNEES: ICD-10-CM

## 2023-06-21 DIAGNOSIS — D64.9 ANEMIA, UNSPECIFIED TYPE: ICD-10-CM

## 2023-06-21 DIAGNOSIS — K21.9 GASTROESOPHAGEAL REFLUX DISEASE, UNSPECIFIED WHETHER ESOPHAGITIS PRESENT: ICD-10-CM

## 2023-06-21 DIAGNOSIS — N92.1 MENORRHAGIA WITH IRREGULAR CYCLE: ICD-10-CM

## 2023-06-21 DIAGNOSIS — Z87.891 SMOKING HISTORY: ICD-10-CM

## 2023-06-21 DIAGNOSIS — I10 HYPERTENSION, UNSPECIFIED TYPE: ICD-10-CM

## 2023-06-21 DIAGNOSIS — M54.50 CHRONIC LOW BACK PAIN, UNSPECIFIED BACK PAIN LATERALITY, UNSPECIFIED WHETHER SCIATICA PRESENT: ICD-10-CM

## 2023-06-21 DIAGNOSIS — E66.01 MORBID OBESITY (HCC): ICD-10-CM

## 2023-06-21 DIAGNOSIS — G89.29 CHRONIC LOW BACK PAIN, UNSPECIFIED BACK PAIN LATERALITY, UNSPECIFIED WHETHER SCIATICA PRESENT: ICD-10-CM

## 2023-06-21 DIAGNOSIS — E66.01 MORBID OBESITY WITH BMI OF 40.0-44.9, ADULT (HCC): Primary | ICD-10-CM

## 2023-06-21 DIAGNOSIS — F41.9 ANXIETY: ICD-10-CM

## 2023-06-21 DIAGNOSIS — G47.9 SLEEP DISORDER: ICD-10-CM

## 2023-06-21 PROCEDURE — 3074F SYST BP LT 130 MM HG: CPT | Performed by: SPECIALIST

## 2023-06-21 PROCEDURE — 3078F DIAST BP <80 MM HG: CPT | Performed by: SPECIALIST

## 2023-06-21 PROCEDURE — 99214 OFFICE O/P EST MOD 30 MIN: CPT | Performed by: SPECIALIST

## 2023-06-21 NOTE — PROGRESS NOTES
Pre-Operative Progress Consultation  Original Consult Dec 2022 at 257 lbs  3 of 3 Provider Visits today    Subjective:     Santos Kanner is a 38 y.o. obese female with a Body mass index is 42.02 kg/m². Valerie Pepper she desires surgery at this time because of health issues and quality of life issues. Santos Kanner has tried multiple diets in her lifetime most recently tried a physician supervised dietary plan    She was given no tasks at her consult aside from having her anemia / menses evaluated by her PCP / GYN. Bariatric comorbidities present are   Patient Active Problem List   Diagnosis    Morbid obesity ([address]    Morbid obesity with BMI of 40.0-44.9, adult ([address]    Hypertension    Chronic back pain    Arthritis of both knees    GERD (gastroesophageal reflux disease)    Anemia    Heavy menses    Marijuana use    Smoking history    Anxiety    Sleep disorder     The patient desires sleeve resection for surgical weight loss. Santos Kanner is here today to check progress with weight loss / evaluate nutritional status and review all subspecialty clearances in hopes of proceeding to the operating room.      Patient Active Problem List    Diagnosis Date Noted    Marijuana use 2023    Smoking history 2023    Anxiety 2023    Sleep disorder 2023    Anemia 2023    Heavy menses 2023    Morbid obesity ([address] 2022    Morbid obesity with BMI of 40.0-44.9, adult ([address] 2022    Hypertension 2022    Chronic back pain 2022    Arthritis of both knees 2022    GERD (gastroesophageal reflux disease) 2022      Past Surgical History:   Procedure Laterality Date    HERNIA REPAIR      ventral hernia repair without mesh      Social History     Tobacco Use    Smoking status: Former     Types: Cigarettes     Quit date: 2019     Years since quittin.4    Smokeless tobacco: Not on file   Substance Use Topics    Alcohol use: No      Family History   Problem Relation Age of

## 2023-07-21 DIAGNOSIS — E66.01 MORBID OBESITY WITH BMI OF 40.0-44.9, ADULT (HCC): ICD-10-CM

## 2023-07-21 DIAGNOSIS — I10 HYPERTENSION, UNSPECIFIED TYPE: ICD-10-CM

## 2023-07-21 DIAGNOSIS — Z01.812 PRE-OPERATIVE LABORATORY EXAMINATION: ICD-10-CM

## 2023-07-21 DIAGNOSIS — E66.01 MORBID OBESITY (HCC): Primary | ICD-10-CM

## 2023-07-24 ENCOUNTER — HOSPITAL ENCOUNTER (OUTPATIENT)
Facility: HOSPITAL | Age: 43
Discharge: HOME | End: 2023-07-27

## 2023-07-24 NOTE — PROGRESS NOTES
CLINICAL NUTRITION PRE-OPERATIVE EDUCATION    Patient's Name: Jarrod Phipps    Age: 37 y.o. YOB: 1980     Sex: female    Education & Materials Provided - Post-op Binder to include:  Liquid Diet Shopping List   Supplemental Resource Guide: MVI, Vitamin B12, Calcium Citrate, Vitamin D, Vitamin B50, and Iron recommendations  Protein Supplement Information   Fluid Requirements/No Straws  No Caffeine or Carbonation   No Alcohol                               No Snacks or No Concentrated Sweets                                   Exercise Guidelines   Key Diet Principles                            Addressed Current Habits/Changes to Make   Patient was instructed on clear liquid diet guidelines to follow for one (1) day prior to surgery. Patient has been educated on the liquid diet to begin day 2 post-op and continue for 2 weeks  Patient understands the timeline for diet progression and the need to remain on full liquid diet until advanced by provider and dietitian. Summary:  Patient has completed the required visits with the Registered Dietitian. During these nutrition visits, we focused on dietary changes, behavior modifications, and the importance of establishing an exercise routine. The pre-op diet protocol that patient was prescribed emphasized low carbohydrate intake (less than 50 grams per day) and 60-80 grams (g) of protein per day. At today's session, patient was educated on the post-op diet and vitamin/mineral protocols. Patient understands the importance of keeping total fat and sugar intake to less than 3g per serving. Patient is aware of the the timeline for the different stages of the post-op diet and is aware that they will be on a modified full liquid diet for 2 weeks post-op. Patient understands that the body needs ~60-70 g/d protein. During the liquid diet phase, patient will need a minimum of 60 g/d protein from supplemental shakes.   Once eating soft protein-rich

## 2023-07-25 ENCOUNTER — CLINICAL DOCUMENTATION (OUTPATIENT)
Age: 43
End: 2023-07-25

## 2023-07-25 NOTE — PROGRESS NOTES
Patient attended bariatric surgery pre-operative education class today. Patient was given a bariatric binder of resources; form acknowledging receipt of said book was completed. Lovenox injections and Zofran were sent to patient's pharmacy on file.

## 2023-07-26 ENCOUNTER — APPOINTMENT (OUTPATIENT)
Facility: HOSPITAL | Age: 43
End: 2023-07-26
Payer: COMMERCIAL

## 2023-07-26 ENCOUNTER — HOSPITAL ENCOUNTER (EMERGENCY)
Facility: HOSPITAL | Age: 43
Discharge: HOME | End: 2023-07-26
Payer: COMMERCIAL

## 2023-07-26 VITALS
TEMPERATURE: 97 F | DIASTOLIC BLOOD PRESSURE: 64 MMHG | SYSTOLIC BLOOD PRESSURE: 117 MMHG | OXYGEN SATURATION: 100 % | RESPIRATION RATE: 18 BRPM | HEART RATE: 72 BPM

## 2023-07-26 DIAGNOSIS — R19.7 NAUSEA VOMITING AND DIARRHEA: ICD-10-CM

## 2023-07-26 DIAGNOSIS — R59.0 LYMPHADENOPATHY, ABDOMINAL: ICD-10-CM

## 2023-07-26 DIAGNOSIS — N30.00 ACUTE CYSTITIS WITHOUT HEMATURIA: ICD-10-CM

## 2023-07-26 DIAGNOSIS — R10.13 ABDOMINAL PAIN, EPIGASTRIC: Primary | ICD-10-CM

## 2023-07-26 DIAGNOSIS — R11.2 NAUSEA VOMITING AND DIARRHEA: ICD-10-CM

## 2023-07-26 LAB
ALBUMIN SERPL-MCNC: 3.6 G/DL (ref 3.4–5)
ALBUMIN/GLOB SERPL: 0.8 (ref 0.8–1.7)
ALP SERPL-CCNC: 108 U/L (ref 45–117)
ALT SERPL-CCNC: 36 U/L (ref 13–56)
ANION GAP SERPL CALC-SCNC: 6 MMOL/L (ref 3–18)
APPEARANCE UR: ABNORMAL
AST SERPL-CCNC: 24 U/L (ref 10–38)
BACTERIA URNS QL MICRO: ABNORMAL /HPF
BASOPHILS # BLD: 0.1 K/UL (ref 0–0.1)
BASOPHILS NFR BLD: 1 % (ref 0–2)
BILIRUB SERPL-MCNC: 0.4 MG/DL (ref 0.2–1)
BILIRUB UR QL: NEGATIVE
BUN SERPL-MCNC: 7 MG/DL (ref 7–18)
BUN/CREAT SERPL: 10 (ref 12–20)
CALCIUM SERPL-MCNC: 8.9 MG/DL (ref 8.5–10.1)
CHLORIDE SERPL-SCNC: 109 MMOL/L (ref 100–111)
CO2 SERPL-SCNC: 25 MMOL/L (ref 21–32)
COLOR UR: YELLOW
CREAT SERPL-MCNC: 0.73 MG/DL (ref 0.6–1.3)
DIFFERENTIAL METHOD BLD: ABNORMAL
EOSINOPHIL # BLD: 0.1 K/UL (ref 0–0.4)
EOSINOPHIL NFR BLD: 1 % (ref 0–5)
EPITH CASTS URNS QL MICRO: ABNORMAL /LPF (ref 0–5)
ERYTHROCYTE [DISTWIDTH] IN BLOOD BY AUTOMATED COUNT: 15.7 % (ref 11.6–14.5)
GLOBULIN SER CALC-MCNC: 4.5 G/DL (ref 2–4)
GLUCOSE SERPL-MCNC: 103 MG/DL (ref 74–99)
GLUCOSE UR STRIP.AUTO-MCNC: NEGATIVE MG/DL
HCT VFR BLD AUTO: 35.5 % (ref 35–45)
HGB BLD-MCNC: 10.9 G/DL (ref 12–16)
HGB UR QL STRIP: ABNORMAL
IMM GRANULOCYTES # BLD AUTO: 0 K/UL (ref 0–0.04)
IMM GRANULOCYTES NFR BLD AUTO: 0 % (ref 0–0.5)
KETONES UR QL STRIP.AUTO: 15 MG/DL
LACTATE BLD-SCNC: 0.61 MMOL/L (ref 0.4–2)
LEUKOCYTE ESTERASE UR QL STRIP.AUTO: ABNORMAL
LIPASE SERPL-CCNC: 146 U/L (ref 73–393)
LYMPHOCYTES # BLD: 2.3 K/UL (ref 0.9–3.6)
LYMPHOCYTES NFR BLD: 34 % (ref 21–52)
MAGNESIUM SERPL-MCNC: 1.9 MG/DL (ref 1.6–2.6)
MCH RBC QN AUTO: 24.2 PG (ref 24–34)
MCHC RBC AUTO-ENTMCNC: 30.7 G/DL (ref 31–37)
MCV RBC AUTO: 78.9 FL (ref 78–100)
MONOCYTES # BLD: 0.5 K/UL (ref 0.05–1.2)
MONOCYTES NFR BLD: 8 % (ref 3–10)
MUCOUS THREADS URNS QL MICRO: ABNORMAL /LPF
NEUTS SEG # BLD: 3.9 K/UL (ref 1.8–8)
NEUTS SEG NFR BLD: 57 % (ref 40–73)
NITRITE UR QL STRIP.AUTO: NEGATIVE
NRBC # BLD: 0 K/UL (ref 0–0.01)
NRBC BLD-RTO: 0 PER 100 WBC
PH UR STRIP: 6 (ref 5–8)
PLATELET # BLD AUTO: 250 K/UL (ref 135–420)
PMV BLD AUTO: 10.2 FL (ref 9.2–11.8)
POTASSIUM SERPL-SCNC: 3.8 MMOL/L (ref 3.5–5.5)
PROT SERPL-MCNC: 8.1 G/DL (ref 6.4–8.2)
PROT UR STRIP-MCNC: NEGATIVE MG/DL
RBC # BLD AUTO: 4.5 M/UL (ref 4.2–5.3)
RBC #/AREA URNS HPF: ABNORMAL /HPF (ref 0–5)
SODIUM SERPL-SCNC: 140 MMOL/L (ref 136–145)
SP GR UR REFRACTOMETRY: 1.02 (ref 1–1.03)
TROPONIN I SERPL HS-MCNC: 8 NG/L (ref 0–54)
UROBILINOGEN UR QL STRIP.AUTO: 1 EU/DL (ref 0.2–1)
WBC # BLD AUTO: 6.9 K/UL (ref 4.6–13.2)
WBC URNS QL MICRO: ABNORMAL /HPF (ref 0–5)

## 2023-07-26 PROCEDURE — 6360000002 HC RX W HCPCS: Performed by: PHYSICIAN ASSISTANT

## 2023-07-26 PROCEDURE — 99285 EMERGENCY DEPT VISIT HI MDM: CPT

## 2023-07-26 PROCEDURE — 83690 ASSAY OF LIPASE: CPT

## 2023-07-26 PROCEDURE — 74177 CT ABD & PELVIS W/CONTRAST: CPT

## 2023-07-26 PROCEDURE — A4216 STERILE WATER/SALINE, 10 ML: HCPCS | Performed by: PHYSICIAN ASSISTANT

## 2023-07-26 PROCEDURE — 96365 THER/PROPH/DIAG IV INF INIT: CPT

## 2023-07-26 PROCEDURE — 6360000004 HC RX CONTRAST MEDICATION: Performed by: PHYSICIAN ASSISTANT

## 2023-07-26 PROCEDURE — C9113 INJ PANTOPRAZOLE SODIUM, VIA: HCPCS | Performed by: PHYSICIAN ASSISTANT

## 2023-07-26 PROCEDURE — 87086 URINE CULTURE/COLONY COUNT: CPT

## 2023-07-26 PROCEDURE — 93005 ELECTROCARDIOGRAM TRACING: CPT | Performed by: PHYSICIAN ASSISTANT

## 2023-07-26 PROCEDURE — 81001 URINALYSIS AUTO W/SCOPE: CPT

## 2023-07-26 PROCEDURE — 85025 COMPLETE CBC W/AUTO DIFF WBC: CPT

## 2023-07-26 PROCEDURE — 83605 ASSAY OF LACTIC ACID: CPT

## 2023-07-26 PROCEDURE — 96375 TX/PRO/DX INJ NEW DRUG ADDON: CPT

## 2023-07-26 PROCEDURE — 80053 COMPREHEN METABOLIC PANEL: CPT

## 2023-07-26 PROCEDURE — 86677 HELICOBACTER PYLORI ANTIBODY: CPT

## 2023-07-26 PROCEDURE — 84484 ASSAY OF TROPONIN QUANT: CPT

## 2023-07-26 PROCEDURE — 2580000003 HC RX 258: Performed by: PHYSICIAN ASSISTANT

## 2023-07-26 PROCEDURE — 83735 ASSAY OF MAGNESIUM: CPT

## 2023-07-26 RX ORDER — ONDANSETRON 2 MG/ML
4 INJECTION INTRAMUSCULAR; INTRAVENOUS
Status: COMPLETED | OUTPATIENT
Start: 2023-07-26 | End: 2023-07-26

## 2023-07-26 RX ORDER — SODIUM CHLORIDE, SODIUM LACTATE, POTASSIUM CHLORIDE, AND CALCIUM CHLORIDE .6; .31; .03; .02 G/100ML; G/100ML; G/100ML; G/100ML
1000 INJECTION, SOLUTION INTRAVENOUS
Status: COMPLETED | OUTPATIENT
Start: 2023-07-26 | End: 2023-07-26

## 2023-07-26 RX ORDER — CEPHALEXIN 500 MG/1
500 CAPSULE ORAL 2 TIMES DAILY
Qty: 14 CAPSULE | Refills: 0 | Status: SHIPPED | OUTPATIENT
Start: 2023-07-26 | End: 2023-08-02

## 2023-07-26 RX ORDER — PANTOPRAZOLE SODIUM 40 MG/1
40 TABLET, DELAYED RELEASE ORAL 2 TIMES DAILY
Qty: 60 TABLET | Refills: 0 | Status: SHIPPED | OUTPATIENT
Start: 2023-07-26 | End: 2023-08-25

## 2023-07-26 RX ORDER — MORPHINE SULFATE 4 MG/ML
4 INJECTION, SOLUTION INTRAMUSCULAR; INTRAVENOUS
Status: COMPLETED | OUTPATIENT
Start: 2023-07-26 | End: 2023-07-26

## 2023-07-26 RX ADMIN — SODIUM CHLORIDE, POTASSIUM CHLORIDE, SODIUM LACTATE AND CALCIUM CHLORIDE 1000 ML: 600; 310; 30; 20 INJECTION, SOLUTION INTRAVENOUS at 15:57

## 2023-07-26 RX ADMIN — ONDANSETRON 4 MG: 2 INJECTION INTRAMUSCULAR; INTRAVENOUS at 15:57

## 2023-07-26 RX ADMIN — MORPHINE SULFATE 4 MG: 4 INJECTION, SOLUTION INTRAMUSCULAR; INTRAVENOUS at 15:57

## 2023-07-26 RX ADMIN — CEFTRIAXONE 1000 MG: 1 INJECTION, POWDER, FOR SOLUTION INTRAMUSCULAR; INTRAVENOUS at 18:35

## 2023-07-26 RX ADMIN — IOPAMIDOL 100 ML: 612 INJECTION, SOLUTION INTRAVENOUS at 16:54

## 2023-07-26 RX ADMIN — SODIUM CHLORIDE 40 MG: 9 INJECTION INTRAMUSCULAR; INTRAVENOUS; SUBCUTANEOUS at 15:57

## 2023-07-26 ASSESSMENT — PAIN - FUNCTIONAL ASSESSMENT: PAIN_FUNCTIONAL_ASSESSMENT: 0-10

## 2023-07-26 ASSESSMENT — PAIN SCALES - GENERAL: PAINLEVEL_OUTOF10: 6

## 2023-07-26 NOTE — ED PROVIDER NOTES
Hypertension     denies    Marijuana use     currently using    Migraines     on meds as needed    Morbid obesity with BMI of 40.0-44.9, adult ([address] )     Osteoarthritis 2018    knees, back    Sleep disorder 2014    requires PO sleep aid       Past Surgical History:  Past Surgical History:   Procedure Laterality Date    HERNIA REPAIR      ventral hernia repair without mesh    UPPER GASTROINTESTINAL ENDOSCOPY  2023       Family History:  Family History   Problem Relation Age of Onset    Dementia Mother     Cancer Father         unknown cancer type    Hypertension Sister     Diabetes Sister        Social History:  Social History     Socioeconomic History    Marital status: Legally    Tobacco Use    Smoking status: Former     Years: 5.00     Types: Cigarettes     Quit date: 2019     Years since quittin.5     Passive exposure: Past    Smokeless tobacco: Never   Vaping Use    Vaping Use: Never used   Substance and Sexual Activity    Alcohol use: No    Drug use: Yes     Types: Marijuana (Weed)     Comment: last used on 2023, instructed to stop for 1 week prior to surgery per anesthesia       Allergies: Allergies   Allergen Reactions    Nsaids Nausea Only       CURRENT MEDICATIONS      No current facility-administered medications for this encounter.      Current Outpatient Medications   Medication Sig Dispense Refill    cephALEXin (KEFLEX) 500 MG capsule Take 1 capsule by mouth 2 times daily for 7 days 14 capsule 0    pantoprazole (PROTONIX) 40 MG tablet Take 1 tablet by mouth in the morning and at bedtime 60 tablet 0    lurasidone (LATUDA) 20 MG TABS tablet Take 1 tablet by mouth Daily with supper Indications: Bipolar      montelukast (SINGULAIR) 10 MG tablet Take 1 tablet by mouth every morning Indications: asthma      vitamin B-12 (CYANOCOBALAMIN) 500 MCG tablet Take 1 tablet by mouth daily      vitamin C (ASCORBIC ACID) 500 MG tablet Take 1 tablet by mouth daily      Multiple

## 2023-07-26 NOTE — ED TRIAGE NOTES
Pt to ED for epigastric abd pain, headache, and rash. Sts it started on Saturday. Endorses N/v,d. Pt seen ar [place] last night for same, dc'd home with pepcid and odt zofran.

## 2023-07-26 NOTE — ED NOTES
Pt arrived with c/o upper abdominal pain x3 days. Pt states that sh has been having diarrhea with vomiting and has not been able to keep anything down. Pt states she was seen at [facility] yesterday for the same and they told her she was withdrawing from her amitriptyline and tramadol. Pt states issues with insurance and certain pharmacies filling the medication. Pt is scheduled for a gastric sleeve surgery on 8/1/2023. Pt also endorses a persistent headache, pt has hx of migraines. Pt in NAD Pt is alert and oriented x4. Vital signs are stable.       Nakita Robin RN  07/26/23 0547

## 2023-07-27 LAB
EKG ATRIAL RATE: 62 BPM
EKG DIAGNOSIS: NORMAL
EKG P AXIS: 55 DEGREES
EKG P-R INTERVAL: 160 MS
EKG Q-T INTERVAL: 402 MS
EKG QRS DURATION: 78 MS
EKG QTC CALCULATION (BAZETT): 408 MS
EKG R AXIS: 80 DEGREES
EKG T AXIS: 46 DEGREES
EKG VENTRICULAR RATE: 62 BPM

## 2023-07-28 ENCOUNTER — HOSPITAL ENCOUNTER (OUTPATIENT)
Facility: HOSPITAL | Age: 43
Discharge: HOME | End: 2023-07-31

## 2023-07-28 LAB
BACTERIA SPEC CULT: NORMAL
CC UR VC: NORMAL
H PYLORI IGA SER-ACNC: NORMAL UNITS
H PYLORI IGG SER IA-ACNC: 0.46 INDEX VALUE (ref 0–0.79)
SENTARA SPECIMEN COLLECTION: NORMAL
SENTARA SPECIMEN COLLECTION: NORMAL
SERVICE CMNT-IMP: NORMAL

## 2023-07-29 LAB
A/G RATIO: 1.2 RATIO (ref 1.1–2.6)
ALBUMIN SERPL-MCNC: 4.4 G/DL (ref 3.5–5)
ALP BLD-CCNC: 104 U/L (ref 25–115)
ALT SERPL-CCNC: 28 U/L (ref 5–40)
ANION GAP SERPL CALCULATED.3IONS-SCNC: 12 MMOL/L (ref 3–15)
AST SERPL-CCNC: 28 U/L (ref 10–37)
BASOPHILS # BLD: 1 % (ref 0–2)
BASOPHILS ABSOLUTE: 0.1 K/UL (ref 0–0.2)
BILIRUB SERPL-MCNC: 0.3 MG/DL (ref 0.2–1.2)
BUN BLDV-MCNC: 8 MG/DL (ref 6–22)
CALCIUM SERPL-MCNC: 9.7 MG/DL (ref 8.4–10.5)
CHLORIDE BLD-SCNC: 103 MMOL/L (ref 98–110)
CLOSTRIDIUM DIFFICILE, PCR: NEGATIVE
CO2: 26 MMOL/L (ref 20–32)
CREAT SERPL-MCNC: 0.6 MG/DL (ref 0.5–1.2)
EOSINOPHIL # BLD: 2 % (ref 0–6)
EOSINOPHILS ABSOLUTE: 0.1 K/UL (ref 0–0.5)
GLOBULIN: 3.6 G/DL (ref 2–4)
GLOMERULAR FILTRATION RATE: >60 ML/MIN/1.73 SQ.M.
GLUCOSE: 101 MG/DL (ref 70–99)
HCT VFR BLD CALC: 38.1 % (ref 35.1–48)
HEMOGLOBIN: 11.2 G/DL (ref 11.7–16)
LYMPHOCYTES # BLD: 37 % (ref 20–45)
LYMPHOCYTES ABSOLUTE: 2.3 K/UL (ref 1–4.8)
MCH RBC QN AUTO: 25 PG (ref 26–34)
MCHC RBC AUTO-ENTMCNC: 29 G/DL (ref 31–36)
MCV RBC AUTO: 84 FL (ref 80–99)
MONOCYTES ABSOLUTE: 0.6 K/UL (ref 0.1–1)
MONOCYTES: 9 % (ref 3–12)
NEUTROPHILS ABSOLUTE: 3.2 K/UL (ref 1.8–7.7)
NEUTROPHILS: 51 % (ref 40–75)
PDW BLD-RTO: 16.3 % (ref 10–15.5)
PLATELET # BLD: 267 K/UL (ref 140–440)
PMV BLD AUTO: 10.9 FL (ref 9–13)
POTASSIUM SERPL-SCNC: 4.1 MMOL/L (ref 3.5–5.5)
PREGNANCY, SERUM: NEGATIVE
RBC: 4.56 M/UL (ref 3.8–5.2)
SODIUM BLD-SCNC: 141 MMOL/L (ref 133–145)
TOTAL PROTEIN: 8 G/DL (ref 6.4–8.3)
WBC: 6.3 K/UL (ref 4–11)

## 2023-08-02 LAB
H PYLORI IGA SER-ACNC: <9 UNITS (ref 0–8.9)
H PYLORI IGG SER IA-ACNC: 0.46 INDEX VALUE (ref 0–0.79)

## 2023-08-14 ENCOUNTER — OFFICE VISIT (OUTPATIENT)
Age: 43
End: 2023-08-14
Payer: COMMERCIAL

## 2023-08-14 VITALS
HEART RATE: 76 BPM | TEMPERATURE: 97.9 F | BODY MASS INDEX: 42.22 KG/M2 | DIASTOLIC BLOOD PRESSURE: 69 MMHG | WEIGHT: 253.4 LBS | HEIGHT: 65 IN | SYSTOLIC BLOOD PRESSURE: 128 MMHG | OXYGEN SATURATION: 100 %

## 2023-08-14 DIAGNOSIS — D64.9 ANEMIA, UNSPECIFIED TYPE: ICD-10-CM

## 2023-08-14 DIAGNOSIS — Z87.891 SMOKING HISTORY: ICD-10-CM

## 2023-08-14 DIAGNOSIS — I10 HYPERTENSION, UNSPECIFIED TYPE: ICD-10-CM

## 2023-08-14 DIAGNOSIS — G89.29 CHRONIC LOW BACK PAIN, UNSPECIFIED BACK PAIN LATERALITY, UNSPECIFIED WHETHER SCIATICA PRESENT: ICD-10-CM

## 2023-08-14 DIAGNOSIS — F41.9 ANXIETY: ICD-10-CM

## 2023-08-14 DIAGNOSIS — K21.9 GASTROESOPHAGEAL REFLUX DISEASE, UNSPECIFIED WHETHER ESOPHAGITIS PRESENT: ICD-10-CM

## 2023-08-14 DIAGNOSIS — R93.89 ABNORMAL CT SCAN: Primary | ICD-10-CM

## 2023-08-14 DIAGNOSIS — G47.9 SLEEP DISORDER: ICD-10-CM

## 2023-08-14 DIAGNOSIS — M17.0 ARTHRITIS OF BOTH KNEES: ICD-10-CM

## 2023-08-14 DIAGNOSIS — E66.01 MORBID OBESITY WITH BMI OF 40.0-44.9, ADULT (HCC): Primary | ICD-10-CM

## 2023-08-14 DIAGNOSIS — F12.90 MARIJUANA USE: ICD-10-CM

## 2023-08-14 DIAGNOSIS — E66.01 MORBID OBESITY (HCC): ICD-10-CM

## 2023-08-14 DIAGNOSIS — M54.50 CHRONIC LOW BACK PAIN, UNSPECIFIED BACK PAIN LATERALITY, UNSPECIFIED WHETHER SCIATICA PRESENT: ICD-10-CM

## 2023-08-14 DIAGNOSIS — R93.89 ABNORMAL CT SCAN: ICD-10-CM

## 2023-08-14 PROCEDURE — 3074F SYST BP LT 130 MM HG: CPT | Performed by: SPECIALIST

## 2023-08-14 PROCEDURE — 99215 OFFICE O/P EST HI 40 MIN: CPT | Performed by: SPECIALIST

## 2023-08-14 PROCEDURE — 3078F DIAST BP <80 MM HG: CPT | Performed by: SPECIALIST

## 2023-08-14 RX ORDER — FAMOTIDINE 20 MG/1
TABLET, FILM COATED ORAL
COMMUNITY
Start: 2023-08-08

## 2023-08-14 RX ORDER — PREDNISONE 20 MG/1
TABLET ORAL
COMMUNITY
Start: 2023-08-08

## 2023-08-14 RX ORDER — AMOXICILLIN AND CLAVULANATE POTASSIUM 875; 125 MG/1; MG/1
1 TABLET, FILM COATED ORAL 2 TIMES DAILY
Qty: 20 TABLET | Refills: 0 | COMMUNITY
Start: 2023-08-12 | End: 2023-08-22

## 2023-08-14 RX ORDER — MOMETASONE FUROATE AND FORMOTEROL FUMARATE DIHYDRATE 200; 5 UG/1; UG/1
AEROSOL RESPIRATORY (INHALATION)
COMMUNITY
Start: 2023-05-25

## 2023-08-14 RX ORDER — MOMETASONE FUROATE 1 MG/G
CREAM TOPICAL
COMMUNITY
Start: 2023-08-08

## 2023-08-14 RX ORDER — DIPHENHYDRAMINE HCL 25 MG
50 CAPSULE ORAL EVERY 6 HOURS PRN
COMMUNITY
Start: 2018-05-04

## 2023-08-14 RX ORDER — BUTALBITAL, ACETAMINOPHEN AND CAFFEINE 50; 325; 40 MG/1; MG/1; MG/1
1 TABLET ORAL EVERY 4 HOURS PRN
COMMUNITY
Start: 2018-01-13

## 2023-08-14 RX ORDER — DICYCLOMINE HCL 20 MG
20 TABLET ORAL 4 TIMES DAILY PRN
COMMUNITY
Start: 2018-01-13

## 2023-08-14 RX ORDER — AMITRIPTYLINE HYDROCHLORIDE 75 MG/1
TABLET ORAL
COMMUNITY
Start: 2023-05-12

## 2023-08-14 ASSESSMENT — PATIENT HEALTH QUESTIONNAIRE - PHQ9
SUM OF ALL RESPONSES TO PHQ QUESTIONS 1-9: 0
SUM OF ALL RESPONSES TO PHQ QUESTIONS 1-9: 0
2. FEELING DOWN, DEPRESSED OR HOPELESS: 0
SUM OF ALL RESPONSES TO PHQ QUESTIONS 1-9: 0
SUM OF ALL RESPONSES TO PHQ9 QUESTIONS 1 & 2: 0
SUM OF ALL RESPONSES TO PHQ QUESTIONS 1-9: 0
1. LITTLE INTEREST OR PLEASURE IN DOING THINGS: 0

## 2023-08-14 NOTE — PROGRESS NOTES
associated with each procedure. During this seminar, there was a long question and answer session where each questions was answered until there were no additional questions. Today, the patient had all of her questions answered and the decision was made today that the patient's preoperative evaluation is acceptable for them  to proceed with bariatric surgery  choosing the sleeve gastrectomy as her surgical option. The patient understands the plan of action    The plan going forward will be to postpone her surgery while she gets a CT of the chest (arranged by her PCP) and is evaluated by a hematologist (ordered today in this office). She understands her surgery will be delayed at least 6 weeks. During this time she will refrain from all [facility] use. Secondary Diagnoses:     DVT / Pulmonary Embolus Risk - The patient is at a higher risk for post operative DVT / pulmonary embolus secondary to their morbid obese status, relative sedentary lifestyle, and impending general anesthetic. We will plan to use anticoagulation therapy pre and post operative as well as  pneumatic compression devices and encourage ambulation once on the hospital nursing floor. The need for possible at home anticoagulation therapy has also been discussed and any decision on this matter will be made during post operative evaluations. The patient understands that their efforts at ambulation are of vital importance to reduce the risk of this complication thus placing significant burden on them as to the prevention of such issues. Signs and symptoms of DVT / PE have been discussed with the patient and they have been instructed to call the office if any these occur in the \"at home\" post op phase    I sent a total of 50 minutes providing this service to the patient today to include discussing their surgical choice, reviewing their work-up to date, and performing a full history and physical examination.      Signed By: ADELA Menjivar

## 2023-09-06 ENCOUNTER — TRANSCRIBE ORDERS (OUTPATIENT)
Facility: HOSPITAL | Age: 43
End: 2023-09-06

## 2023-09-06 DIAGNOSIS — R59.1 LYMPHADENOPATHY: Primary | ICD-10-CM

## 2023-09-19 ENCOUNTER — TELEPHONE (OUTPATIENT)
Age: 43
End: 2023-09-19

## 2023-09-19 NOTE — TELEPHONE ENCOUNTER
Rimma from Dr. Destiny Mcdaniel office left this RN a message stating \"Patient's lung nodules are too small at this time to work up, so it is ok for her to proceed with bariatric surgery. \"    In turn, RN contacted Arthurine Dears back to confirm the message was retrieved correctly. She confirmed, and this information was forwarded to Dr. Kody Bentley and Nataly Dao, Surgical Coordinator.

## 2023-09-28 ENCOUNTER — TELEPHONE (OUTPATIENT)
Age: 43
End: 2023-09-28

## 2023-09-28 NOTE — TELEPHONE ENCOUNTER
Patient was contacted to discuss some important pre-op information and education in preparation for surgery. Diabetes:  Have you been testing your blood sugar levels at home, and what have they been the past few days? Not diabetic     If they are not significantly lower than 200 the entire month prior to surgery, you need to let us know and contact your family physician for an appointment. Hypertension:  Are you monitoring your blood pressure at home, and what has it been? No hypertension     If your blood pressure has not been well-controlled with a systolic pressure lower than  696 and a diastolic pressure lower than 100, you need to contact your family physician for an appointment. Steroids (oral and/or injectable):  Have you recently taken any oral steroids such as Prednisone or a Medrol dose pack for a respiratory infection or COVID? No  Have you had any steroid injections such as a cortisone injection in your back, knee, foot, or any other part of your body? No     You can not have any oral steroids and/or steroid injections 45 days prior to surgery. NSAIDS:  Have you recently taken any NSAIDS such as Mobic, Aleve, Naprosyn, Motrin, BC or Goody's powder? Pt aware     NSAIDS must be stopped 14 days prior to surgery. If you are currently taking aspirin or Plavix that a physician has prescribed, continue to take it as prescribed, and a provider in our office will advise you during your pre-op appointment. Birth Control:  Are you currently taking any form of birth control? If so, are you taking the Depo Provera injection or oral birth control pills? No      You need to stop taking it two weeks prior to surgery and can start back two weeks after surgery. You will need to use another form of protection during this time, such as condoms to avoid pregancy. If you have an IUD or implant as a form of birth control, it is okay, and you may continue.     Medication:  Have you had any significant

## 2023-10-23 ENCOUNTER — ANESTHESIA EVENT (OUTPATIENT)
Facility: HOSPITAL | Age: 43
DRG: 403 | End: 2023-10-23
Payer: COMMERCIAL

## 2023-10-26 ENCOUNTER — HOSPITAL ENCOUNTER (INPATIENT)
Facility: HOSPITAL | Age: 43
LOS: 1 days | Discharge: HOME | DRG: 403 | End: 2023-10-27
Attending: SPECIALIST | Admitting: SPECIALIST
Payer: COMMERCIAL

## 2023-10-26 ENCOUNTER — ANESTHESIA (OUTPATIENT)
Facility: HOSPITAL | Age: 43
DRG: 403 | End: 2023-10-26
Payer: COMMERCIAL

## 2023-10-26 DIAGNOSIS — G89.18 POST-OP PAIN: Primary | ICD-10-CM

## 2023-10-26 PROBLEM — E66.01 SEVERE OBESITY (BMI 35.0-39.9) WITH COMORBIDITY (HCC): Status: ACTIVE | Noted: 2023-10-26

## 2023-10-26 PROBLEM — K44.9 DIAPHRAGMATIC HERNIA WITHOUT OBSTRUCTION AND WITHOUT GANGRENE: Status: ACTIVE | Noted: 2023-10-26

## 2023-10-26 LAB
ABO + RH BLD: NORMAL
BLOOD GROUP ANTIBODIES SERPL: NORMAL
HCG UR QL: NEGATIVE
HCG UR QL: NEGATIVE
SPECIMEN EXP DATE BLD: NORMAL

## 2023-10-26 PROCEDURE — 7100000001 HC PACU RECOVERY - ADDTL 15 MIN: Performed by: SPECIALIST

## 2023-10-26 PROCEDURE — 6360000002 HC RX W HCPCS: Performed by: NURSE ANESTHETIST, CERTIFIED REGISTERED

## 2023-10-26 PROCEDURE — C1781 MESH (IMPLANTABLE): HCPCS | Performed by: SPECIALIST

## 2023-10-26 PROCEDURE — 3700000000 HC ANESTHESIA ATTENDED CARE: Performed by: SPECIALIST

## 2023-10-26 PROCEDURE — 3600000005 HC SURGERY LEVEL 5 BASE: Performed by: SPECIALIST

## 2023-10-26 PROCEDURE — 6360000002 HC RX W HCPCS: Performed by: SPECIALIST

## 2023-10-26 PROCEDURE — 7100000000 HC PACU RECOVERY - FIRST 15 MIN: Performed by: SPECIALIST

## 2023-10-26 PROCEDURE — 6360000002 HC RX W HCPCS: Performed by: ANESTHESIOLOGY

## 2023-10-26 PROCEDURE — 0FB04ZX EXCISION OF LIVER, PERCUTANEOUS ENDOSCOPIC APPROACH, DIAGNOSTIC: ICD-10-PCS | Performed by: SPECIALIST

## 2023-10-26 PROCEDURE — 2580000003 HC RX 258: Performed by: SPECIALIST

## 2023-10-26 PROCEDURE — 3600000015 HC SURGERY LEVEL 5 ADDTL 15MIN: Performed by: SPECIALIST

## 2023-10-26 PROCEDURE — 6370000000 HC RX 637 (ALT 250 FOR IP): Performed by: SPECIALIST

## 2023-10-26 PROCEDURE — 88307 TISSUE EXAM BY PATHOLOGIST: CPT

## 2023-10-26 PROCEDURE — 88313 SPECIAL STAINS GROUP 2: CPT

## 2023-10-26 PROCEDURE — 86850 RBC ANTIBODY SCREEN: CPT

## 2023-10-26 PROCEDURE — 0DB68ZX EXCISION OF STOMACH, VIA NATURAL OR ARTIFICIAL OPENING ENDOSCOPIC, DIAGNOSTIC: ICD-10-PCS | Performed by: SPECIALIST

## 2023-10-26 PROCEDURE — C9113 INJ PANTOPRAZOLE SODIUM, VIA: HCPCS | Performed by: SPECIALIST

## 2023-10-26 PROCEDURE — 2720000010 HC SURG SUPPLY STERILE: Performed by: SPECIALIST

## 2023-10-26 PROCEDURE — 2500000003 HC RX 250 WO HCPCS: Performed by: SPECIALIST

## 2023-10-26 PROCEDURE — 0DB64Z3 EXCISION OF STOMACH, PERCUTANEOUS ENDOSCOPIC APPROACH, VERTICAL: ICD-10-PCS | Performed by: SPECIALIST

## 2023-10-26 PROCEDURE — 2709999900 HC NON-CHARGEABLE SUPPLY: Performed by: SPECIALIST

## 2023-10-26 PROCEDURE — 1100000000 HC RM PRIVATE

## 2023-10-26 PROCEDURE — 86900 BLOOD TYPING SEROLOGIC ABO: CPT

## 2023-10-26 PROCEDURE — 2500000003 HC RX 250 WO HCPCS: Performed by: NURSE ANESTHETIST, CERTIFIED REGISTERED

## 2023-10-26 PROCEDURE — 36415 COLL VENOUS BLD VENIPUNCTURE: CPT

## 2023-10-26 PROCEDURE — A4216 STERILE WATER/SALINE, 10 ML: HCPCS | Performed by: SPECIALIST

## 2023-10-26 PROCEDURE — 81025 URINE PREGNANCY TEST: CPT

## 2023-10-26 PROCEDURE — 88331 PATH CONSLTJ SURG 1 BLK 1SPC: CPT

## 2023-10-26 PROCEDURE — 86901 BLOOD TYPING SEROLOGIC RH(D): CPT

## 2023-10-26 PROCEDURE — 3700000001 HC ADD 15 MINUTES (ANESTHESIA): Performed by: SPECIALIST

## 2023-10-26 PROCEDURE — 88305 TISSUE EXAM BY PATHOLOGIST: CPT

## 2023-10-26 PROCEDURE — C1713 ANCHOR/SCREW BN/BN,TIS/BN: HCPCS | Performed by: SPECIALIST

## 2023-10-26 RX ORDER — MORPHINE SULFATE 2 MG/ML
2 INJECTION, SOLUTION INTRAMUSCULAR; INTRAVENOUS ONCE
Status: COMPLETED | OUTPATIENT
Start: 2023-10-26 | End: 2023-10-26

## 2023-10-26 RX ORDER — SODIUM CHLORIDE 0.9 % (FLUSH) 0.9 %
5-40 SYRINGE (ML) INJECTION EVERY 12 HOURS SCHEDULED
Status: DISCONTINUED | OUTPATIENT
Start: 2023-10-26 | End: 2023-10-27 | Stop reason: HOSPADM

## 2023-10-26 RX ORDER — CLONIDINE 0.3 MG/24H
1 PATCH, EXTENDED RELEASE TRANSDERMAL WEEKLY
Status: DISCONTINUED | OUTPATIENT
Start: 2023-10-26 | End: 2023-10-27 | Stop reason: HOSPADM

## 2023-10-26 RX ORDER — PROPOFOL 10 MG/ML
INJECTION, EMULSION INTRAVENOUS PRN
Status: DISCONTINUED | OUTPATIENT
Start: 2023-10-26 | End: 2023-10-26 | Stop reason: SDUPTHER

## 2023-10-26 RX ORDER — ENOXAPARIN SODIUM 100 MG/ML
40 INJECTION SUBCUTANEOUS EVERY 12 HOURS
Status: DISCONTINUED | OUTPATIENT
Start: 2023-10-27 | End: 2023-10-27 | Stop reason: HOSPADM

## 2023-10-26 RX ORDER — SODIUM CHLORIDE 0.9 % (FLUSH) 0.9 %
5-40 SYRINGE (ML) INJECTION PRN
Status: DISCONTINUED | OUTPATIENT
Start: 2023-10-26 | End: 2023-10-27 | Stop reason: HOSPADM

## 2023-10-26 RX ORDER — FENTANYL CITRATE 50 UG/ML
25 INJECTION, SOLUTION INTRAMUSCULAR; INTRAVENOUS EVERY 5 MIN PRN
Status: DISCONTINUED | OUTPATIENT
Start: 2023-10-26 | End: 2023-10-26 | Stop reason: HOSPADM

## 2023-10-26 RX ORDER — KETOROLAC TROMETHAMINE 15 MG/ML
15 INJECTION, SOLUTION INTRAMUSCULAR; INTRAVENOUS ONCE
Status: COMPLETED | OUTPATIENT
Start: 2023-10-26 | End: 2023-10-26

## 2023-10-26 RX ORDER — HYDROMORPHONE HYDROCHLORIDE 1 MG/ML
INJECTION, SOLUTION INTRAMUSCULAR; INTRAVENOUS; SUBCUTANEOUS PRN
Status: DISCONTINUED | OUTPATIENT
Start: 2023-10-26 | End: 2023-10-26 | Stop reason: SDUPTHER

## 2023-10-26 RX ORDER — ONDANSETRON 2 MG/ML
INJECTION INTRAMUSCULAR; INTRAVENOUS PRN
Status: DISCONTINUED | OUTPATIENT
Start: 2023-10-26 | End: 2023-10-26 | Stop reason: SDUPTHER

## 2023-10-26 RX ORDER — SODIUM CHLORIDE 0.9 % (FLUSH) 0.9 %
5-40 SYRINGE (ML) INJECTION EVERY 12 HOURS SCHEDULED
Status: DISCONTINUED | OUTPATIENT
Start: 2023-10-26 | End: 2023-10-26 | Stop reason: HOSPADM

## 2023-10-26 RX ORDER — BUPIVACAINE HYDROCHLORIDE 5 MG/ML
INJECTION, SOLUTION EPIDURAL; INTRACAUDAL PRN
Status: DISCONTINUED | OUTPATIENT
Start: 2023-10-26 | End: 2023-10-26 | Stop reason: ALTCHOICE

## 2023-10-26 RX ORDER — ENOXAPARIN SODIUM 100 MG/ML
40 INJECTION SUBCUTANEOUS ONCE
Status: COMPLETED | OUTPATIENT
Start: 2023-10-26 | End: 2023-10-26

## 2023-10-26 RX ORDER — KETAMINE HCL IN NACL, ISO-OSM 100MG/10ML
SYRINGE (ML) INJECTION PRN
Status: DISCONTINUED | OUTPATIENT
Start: 2023-10-26 | End: 2023-10-26 | Stop reason: SDUPTHER

## 2023-10-26 RX ORDER — SODIUM CHLORIDE 0.9 % (FLUSH) 0.9 %
5-40 SYRINGE (ML) INJECTION PRN
Status: DISCONTINUED | OUTPATIENT
Start: 2023-10-26 | End: 2023-10-26 | Stop reason: HOSPADM

## 2023-10-26 RX ORDER — SIMETHICONE 80 MG
80 TABLET,CHEWABLE ORAL EVERY 6 HOURS PRN
Status: DISCONTINUED | OUTPATIENT
Start: 2023-10-26 | End: 2023-10-27 | Stop reason: HOSPADM

## 2023-10-26 RX ORDER — HYDROMORPHONE HYDROCHLORIDE 1 MG/ML
0.5 INJECTION, SOLUTION INTRAMUSCULAR; INTRAVENOUS; SUBCUTANEOUS EVERY 5 MIN PRN
Status: DISCONTINUED | OUTPATIENT
Start: 2023-10-26 | End: 2023-10-26 | Stop reason: HOSPADM

## 2023-10-26 RX ORDER — SODIUM CHLORIDE, SODIUM LACTATE, POTASSIUM CHLORIDE, CALCIUM CHLORIDE 600; 310; 30; 20 MG/100ML; MG/100ML; MG/100ML; MG/100ML
INJECTION, SOLUTION INTRAVENOUS CONTINUOUS
Status: DISCONTINUED | OUTPATIENT
Start: 2023-10-26 | End: 2023-10-27 | Stop reason: HOSPADM

## 2023-10-26 RX ORDER — ACETAMINOPHEN 500 MG
1000 TABLET ORAL ONCE
Status: COMPLETED | OUTPATIENT
Start: 2023-10-26 | End: 2023-10-26

## 2023-10-26 RX ORDER — LIDOCAINE HYDROCHLORIDE 20 MG/ML
INJECTION, SOLUTION EPIDURAL; INFILTRATION; INTRACAUDAL; PERINEURAL PRN
Status: DISCONTINUED | OUTPATIENT
Start: 2023-10-26 | End: 2023-10-26 | Stop reason: SDUPTHER

## 2023-10-26 RX ORDER — FENTANYL CITRATE 50 UG/ML
INJECTION, SOLUTION INTRAMUSCULAR; INTRAVENOUS PRN
Status: DISCONTINUED | OUTPATIENT
Start: 2023-10-26 | End: 2023-10-26 | Stop reason: SDUPTHER

## 2023-10-26 RX ORDER — KETOROLAC TROMETHAMINE 15 MG/ML
30 INJECTION, SOLUTION INTRAMUSCULAR; INTRAVENOUS EVERY 6 HOURS
Status: DISCONTINUED | OUTPATIENT
Start: 2023-10-27 | End: 2023-10-27 | Stop reason: HOSPADM

## 2023-10-26 RX ORDER — SODIUM CHLORIDE 9 MG/ML
INJECTION, SOLUTION INTRAVENOUS PRN
Status: DISCONTINUED | OUTPATIENT
Start: 2023-10-26 | End: 2023-10-26 | Stop reason: HOSPADM

## 2023-10-26 RX ORDER — MIDAZOLAM HYDROCHLORIDE 1 MG/ML
INJECTION INTRAMUSCULAR; INTRAVENOUS PRN
Status: DISCONTINUED | OUTPATIENT
Start: 2023-10-26 | End: 2023-10-26 | Stop reason: SDUPTHER

## 2023-10-26 RX ORDER — ONDANSETRON 4 MG/1
4 TABLET, ORALLY DISINTEGRATING ORAL EVERY 8 HOURS PRN
Status: DISCONTINUED | OUTPATIENT
Start: 2023-10-26 | End: 2023-10-27 | Stop reason: HOSPADM

## 2023-10-26 RX ORDER — ROCURONIUM BROMIDE 10 MG/ML
INJECTION, SOLUTION INTRAVENOUS PRN
Status: DISCONTINUED | OUTPATIENT
Start: 2023-10-26 | End: 2023-10-26 | Stop reason: SDUPTHER

## 2023-10-26 RX ORDER — LABETALOL HYDROCHLORIDE 5 MG/ML
INJECTION, SOLUTION INTRAVENOUS PRN
Status: DISCONTINUED | OUTPATIENT
Start: 2023-10-26 | End: 2023-10-26 | Stop reason: SDUPTHER

## 2023-10-26 RX ORDER — SODIUM CHLORIDE 9 MG/ML
INJECTION, SOLUTION INTRAVENOUS PRN
Status: DISCONTINUED | OUTPATIENT
Start: 2023-10-26 | End: 2023-10-27 | Stop reason: HOSPADM

## 2023-10-26 RX ORDER — MORPHINE SULFATE 10 MG/ML
6 INJECTION, SOLUTION INTRAMUSCULAR; INTRAVENOUS
Status: DISCONTINUED | OUTPATIENT
Start: 2023-10-26 | End: 2023-10-27

## 2023-10-26 RX ORDER — ONDANSETRON 2 MG/ML
4 INJECTION INTRAMUSCULAR; INTRAVENOUS EVERY 6 HOURS PRN
Status: DISCONTINUED | OUTPATIENT
Start: 2023-10-26 | End: 2023-10-27 | Stop reason: HOSPADM

## 2023-10-26 RX ADMIN — KETOROLAC TROMETHAMINE 15 MG: 15 INJECTION, SOLUTION INTRAMUSCULAR; INTRAVENOUS at 18:41

## 2023-10-26 RX ADMIN — FENTANYL CITRATE 25 MCG: 50 INJECTION INTRAMUSCULAR; INTRAVENOUS at 16:23

## 2023-10-26 RX ADMIN — PANTOPRAZOLE SODIUM 40 MG: 40 INJECTION, POWDER, FOR SOLUTION INTRAVENOUS at 22:36

## 2023-10-26 RX ADMIN — ONDANSETRON HYDROCHLORIDE 4 MG: 2 INJECTION INTRAMUSCULAR; INTRAVENOUS at 14:30

## 2023-10-26 RX ADMIN — HYDROMORPHONE HYDROCHLORIDE 0.5 MG: 1 INJECTION, SOLUTION INTRAMUSCULAR; INTRAVENOUS; SUBCUTANEOUS at 16:31

## 2023-10-26 RX ADMIN — ROCURONIUM BROMIDE 50 MG: 10 INJECTION, SOLUTION INTRAVENOUS at 14:06

## 2023-10-26 RX ADMIN — FENTANYL CITRATE 25 MCG: 50 INJECTION INTRAMUSCULAR; INTRAVENOUS at 16:16

## 2023-10-26 RX ADMIN — MIDAZOLAM 2 MG: 1 INJECTION INTRAMUSCULAR; INTRAVENOUS at 14:01

## 2023-10-26 RX ADMIN — NYSTATIN 500000 UNITS: 100000 SUSPENSION ORAL at 11:00

## 2023-10-26 RX ADMIN — SODIUM CHLORIDE, SODIUM LACTATE, POTASSIUM CHLORIDE, AND CALCIUM CHLORIDE: 600; 310; 30; 20 INJECTION, SOLUTION INTRAVENOUS at 15:20

## 2023-10-26 RX ADMIN — SODIUM CHLORIDE, PRESERVATIVE FREE 20 MG: 5 INJECTION INTRAVENOUS at 11:01

## 2023-10-26 RX ADMIN — MORPHINE SULFATE 2 MG: 2 INJECTION, SOLUTION INTRAMUSCULAR; INTRAVENOUS at 18:49

## 2023-10-26 RX ADMIN — LIDOCAINE HYDROCHLORIDE 100 MG: 20 INJECTION, SOLUTION EPIDURAL; INFILTRATION; INTRACAUDAL; PERINEURAL at 14:06

## 2023-10-26 RX ADMIN — FENTANYL CITRATE 50 MCG: 50 INJECTION, SOLUTION INTRAMUSCULAR; INTRAVENOUS at 14:10

## 2023-10-26 RX ADMIN — FENTANYL CITRATE 50 MCG: 50 INJECTION, SOLUTION INTRAMUSCULAR; INTRAVENOUS at 14:06

## 2023-10-26 RX ADMIN — HYDROMORPHONE HYDROCHLORIDE 1 MG: 1 INJECTION, SOLUTION INTRAMUSCULAR; INTRAVENOUS; SUBCUTANEOUS at 14:22

## 2023-10-26 RX ADMIN — ROCURONIUM BROMIDE 5 MG: 10 INJECTION, SOLUTION INTRAVENOUS at 15:28

## 2023-10-26 RX ADMIN — Medication 50 MG: at 14:15

## 2023-10-26 RX ADMIN — ENOXAPARIN SODIUM 40 MG: 100 INJECTION SUBCUTANEOUS at 11:02

## 2023-10-26 RX ADMIN — PROPOFOL 200 MG: 10 INJECTION, EMULSION INTRAVENOUS at 14:06

## 2023-10-26 RX ADMIN — LABETALOL HYDROCHLORIDE 5 MG: 5 INJECTION, SOLUTION INTRAVENOUS at 14:35

## 2023-10-26 RX ADMIN — ONDANSETRON 4 MG: 2 INJECTION INTRAMUSCULAR; INTRAVENOUS at 22:36

## 2023-10-26 RX ADMIN — LABETALOL HYDROCHLORIDE 5 MG: 5 INJECTION, SOLUTION INTRAVENOUS at 14:25

## 2023-10-26 RX ADMIN — ACETAMINOPHEN 1000 MG: 500 TABLET ORAL at 11:00

## 2023-10-26 RX ADMIN — WATER 2000 MG: 1 INJECTION INTRAMUSCULAR; INTRAVENOUS; SUBCUTANEOUS at 14:12

## 2023-10-26 RX ADMIN — SUGAMMADEX 442 MG: 100 INJECTION, SOLUTION INTRAVENOUS at 15:41

## 2023-10-26 RX ADMIN — MIDAZOLAM 2 MG: 1 INJECTION INTRAMUSCULAR; INTRAVENOUS at 13:59

## 2023-10-26 RX ADMIN — ROCURONIUM BROMIDE 20 MG: 10 INJECTION, SOLUTION INTRAVENOUS at 14:30

## 2023-10-26 RX ADMIN — SODIUM CHLORIDE, SODIUM LACTATE, POTASSIUM CHLORIDE, AND CALCIUM CHLORIDE: 600; 310; 30; 20 INJECTION, SOLUTION INTRAVENOUS at 10:56

## 2023-10-26 RX ADMIN — MORPHINE SULFATE 6 MG: 10 INJECTION INTRAVENOUS at 20:25

## 2023-10-26 RX ADMIN — SODIUM CHLORIDE, SODIUM LACTATE, POTASSIUM CHLORIDE, AND CALCIUM CHLORIDE: 600; 310; 30; 20 INJECTION, SOLUTION INTRAVENOUS at 14:25

## 2023-10-26 RX ADMIN — KETOROLAC TROMETHAMINE 15 MG: 15 INJECTION, SOLUTION INTRAMUSCULAR; INTRAVENOUS at 17:57

## 2023-10-26 ASSESSMENT — PAIN DESCRIPTION - LOCATION
LOCATION: ABDOMEN

## 2023-10-26 ASSESSMENT — PAIN SCALES - GENERAL
PAINLEVEL_OUTOF10: 5
PAINLEVEL_OUTOF10: 8
PAINLEVEL_OUTOF10: 7
PAINLEVEL_OUTOF10: 10
PAINLEVEL_OUTOF10: 10
PAINLEVEL_OUTOF10: 5
PAINLEVEL_OUTOF10: 8
PAINLEVEL_OUTOF10: 0
PAINLEVEL_OUTOF10: 10
PAINLEVEL_OUTOF10: 0

## 2023-10-26 ASSESSMENT — PAIN DESCRIPTION - DESCRIPTORS
DESCRIPTORS: SORE
DESCRIPTORS: ACHING
DESCRIPTORS: SORE;PRESSURE

## 2023-10-26 ASSESSMENT — PAIN DESCRIPTION - ORIENTATION
ORIENTATION: MID
ORIENTATION: UPPER
ORIENTATION: UPPER

## 2023-10-26 ASSESSMENT — PAIN - FUNCTIONAL ASSESSMENT: PAIN_FUNCTIONAL_ASSESSMENT: 0-10

## 2023-10-26 NOTE — PERIOP NOTE
TRANSFER - OUT REPORT:    Verbal report given to Shelly RIVERA RN on Valerie Bethea  being transferred to [address] [place] [address] for routine post-op       Report consisted of patient's Situation, Background, Assessment and   Recommendations(SBAR). Information from the following report(s) Nurse Handoff Report, Adult Overview, Surgery Report, Intake/Output, MAR, Recent Results, and Med Rec Status was reviewed with the receiving nurse. Lines:   Peripheral IV 10/26/23 Left;Proximal Forearm (Active)   Site Assessment Clean, dry & intact 10/26/23 1554   Line Status Infusing 10/26/23 [place] Connections checked and tightened 10/26/23 1055   Phlebitis Assessment No symptoms 10/26/23 1554   Infiltration Assessment 0 10/26/23 1554   Alcohol Cap Used No 10/26/23 1554   Dressing Status Clean, dry & intact 10/26/23 1554   Dressing Type Transparent 10/26/23 1554        Opportunity for questions and clarification was provided.       Patient transported with:  Registered Nurse

## 2023-10-26 NOTE — PERIOP NOTE
Pt stated that she last smoked marijuana 1 week ago, her room and belongings have a very strong odor of marijuana. She denies smoking this morning. Cessation discussed with pt and strongly encouraged to avoid exposure to second hand smoke and reviewed the importance of using the incentive spirometer. She verbalized understanding. Urine pregnancy results negative and verified with Giovana WELLS.

## 2023-10-26 NOTE — PERIOP NOTE
TRANSFER - IN REPORT:    Verbal report received from or nurse and crna on Jesse Yost  being received from or for routine post-op      Report consisted of patient's Situation, Background, Assessment and   Recommendations(SBAR). Information from the following report(s) Nurse Handoff Report, Adult Overview, Surgery Report, Intake/Output, MAR, and Med Rec Status was reviewed with the receiving nurse. Opportunity for questions and clarification was provided. Assessment completed upon patient's arrival to unit and care assumed.

## 2023-10-26 NOTE — OP NOTE
which was then insufflated. The Visi-Port was then placed   at that site,then 4 additional trocars were placed in the usual U-shaped   configuration with a subxiphoid incision being made to accommodate the   [facility] retractor. On entering the abdomen, the patient was noted to have a   massively fatty liver with possible evidence of early steatohepatitis. In addition the patient had a numerous lesions very tiny about 1 mm in size throughout her entire liver. This appeared to be consistent with probably granulomatous disease such as sarcoidosis. At this time. I took a wedge biopsy of a couple of these lesions and sent it to pathology for frozen section. After the pathologist looked at the specimens he felt like it was consistent with necrotizing granulomas which could be consistent with sarcoidosis. No cancer was identified. I elevated the liver and noted the   patient had a diaphragmatic hernia present. I began the operation by choosing an area 2-3 cm   proximal to the pylorus and within the gastroepiploic vessels I began to divide off these vessels individually. I moved cephalad toward short gastric vessels, which were very difficult to take down due to the proximity   to the splenic hilum. I was able to do so, clearing the entire left crural area. I then placed a Visigi tube,   impacting at the distal antrum. I then began the resection with the powered [place]   stapler using the black loads with Endopath buttress strips for the first firing tangential along the   antral region. The second and subsequent firings were used with green and gold  reloads and the same buttress strips reaching just past the incisura region. The remainder of the 5 vertical   firings completed the resection at the left crural region. I then tested   the pouch via the Visigi tube using dilute methylene blue,it was noted to be completely   Watertight.    At this juncture due to some gastric wall thickening  of the sleeve,   I

## 2023-10-27 ENCOUNTER — APPOINTMENT (OUTPATIENT)
Facility: HOSPITAL | Age: 43
DRG: 403 | End: 2023-10-27
Attending: SPECIALIST
Payer: COMMERCIAL

## 2023-10-27 VITALS
WEIGHT: 243.7 LBS | OXYGEN SATURATION: 100 % | TEMPERATURE: 97.5 F | HEART RATE: 64 BPM | DIASTOLIC BLOOD PRESSURE: 77 MMHG | HEIGHT: 66 IN | RESPIRATION RATE: 18 BRPM | SYSTOLIC BLOOD PRESSURE: 137 MMHG | BODY MASS INDEX: 39.17 KG/M2

## 2023-10-27 PROCEDURE — 6360000002 HC RX W HCPCS: Performed by: SPECIALIST

## 2023-10-27 PROCEDURE — 6360000004 HC RX CONTRAST MEDICATION: Performed by: SPECIALIST

## 2023-10-27 PROCEDURE — 2500000003 HC RX 250 WO HCPCS: Performed by: SPECIALIST

## 2023-10-27 PROCEDURE — 74240 X-RAY XM UPR GI TRC 1CNTRST: CPT

## 2023-10-27 PROCEDURE — 6370000000 HC RX 637 (ALT 250 FOR IP): Performed by: SPECIALIST

## 2023-10-27 RX ORDER — ENOXAPARIN SODIUM 100 MG/ML
40 INJECTION SUBCUTANEOUS EVERY 12 HOURS
Qty: 28 EACH | Refills: 0 | Status: SHIPPED | OUTPATIENT
Start: 2023-10-27 | End: 2023-11-10

## 2023-10-27 RX ORDER — OXYCODONE HYDROCHLORIDE AND ACETAMINOPHEN 5; 325 MG/1; MG/1
1 TABLET ORAL EVERY 4 HOURS PRN
Status: DISCONTINUED | OUTPATIENT
Start: 2023-10-27 | End: 2023-10-27 | Stop reason: HOSPADM

## 2023-10-27 RX ORDER — OXYCODONE HYDROCHLORIDE AND ACETAMINOPHEN 5; 325 MG/1; MG/1
1 TABLET ORAL EVERY 6 HOURS PRN
Qty: 28 TABLET | Refills: 0 | Status: SHIPPED | OUTPATIENT
Start: 2023-10-27 | End: 2023-11-03

## 2023-10-27 RX ADMIN — BARIUM SULFATE 20 ML: 960 POWDER, FOR SUSPENSION ORAL at 07:15

## 2023-10-27 RX ADMIN — ENOXAPARIN SODIUM 40 MG: 100 INJECTION SUBCUTANEOUS at 14:27

## 2023-10-27 RX ADMIN — ONDANSETRON 4 MG: 2 INJECTION INTRAMUSCULAR; INTRAVENOUS at 10:24

## 2023-10-27 RX ADMIN — PROMETHAZINE HYDROCHLORIDE 12.5 MG: 25 INJECTION INTRAMUSCULAR; INTRAVENOUS at 12:34

## 2023-10-27 RX ADMIN — ENOXAPARIN SODIUM 40 MG: 100 INJECTION SUBCUTANEOUS at 02:42

## 2023-10-27 RX ADMIN — MORPHINE SULFATE 6 MG: 10 INJECTION INTRAVENOUS at 04:48

## 2023-10-27 RX ADMIN — OXYCODONE HYDROCHLORIDE AND ACETAMINOPHEN 1 TABLET: 5; 325 TABLET ORAL at 14:27

## 2023-10-27 RX ADMIN — KETOROLAC TROMETHAMINE 30 MG: 15 INJECTION, SOLUTION INTRAMUSCULAR; INTRAVENOUS at 06:44

## 2023-10-27 RX ADMIN — MORPHINE SULFATE 6 MG: 10 INJECTION INTRAVENOUS at 00:05

## 2023-10-27 RX ADMIN — DIATRIZOATE MEGLUMINE AND DIATRIZOATE SODIUM 20 ML: 660; 100 LIQUID ORAL; RECTAL at 07:15

## 2023-10-27 RX ADMIN — SIMETHICONE 80 MG: 80 TABLET, CHEWABLE ORAL at 12:35

## 2023-10-27 RX ADMIN — KETOROLAC TROMETHAMINE 30 MG: 15 INJECTION, SOLUTION INTRAMUSCULAR; INTRAVENOUS at 01:33

## 2023-10-27 RX ADMIN — OXYCODONE HYDROCHLORIDE AND ACETAMINOPHEN 1 TABLET: 5; 325 TABLET ORAL at 10:16

## 2023-10-27 RX ADMIN — KETOROLAC TROMETHAMINE 30 MG: 15 INJECTION, SOLUTION INTRAMUSCULAR; INTRAVENOUS at 12:35

## 2023-10-27 ASSESSMENT — PAIN DESCRIPTION - LOCATION
LOCATION: ABDOMEN

## 2023-10-27 ASSESSMENT — PAIN DESCRIPTION - ORIENTATION
ORIENTATION: MID

## 2023-10-27 ASSESSMENT — PAIN SCALES - GENERAL
PAINLEVEL_OUTOF10: 8
PAINLEVEL_OUTOF10: 8
PAINLEVEL_OUTOF10: 10
PAINLEVEL_OUTOF10: 8
PAINLEVEL_OUTOF10: 6
PAINLEVEL_OUTOF10: 8
PAINLEVEL_OUTOF10: 10

## 2023-10-27 ASSESSMENT — PAIN DESCRIPTION - DESCRIPTORS
DESCRIPTORS: ACHING

## 2023-10-27 NOTE — PROGRESS NOTES
Discharge instructions reviewed with the patient. Patient verbalized understanding and verified by teach back. All questions answered. IV discontinued, no redness, swelling or pain noted. Patient awaiting [employer] for transportation home, with an ETA of 15 min.

## 2023-10-27 NOTE — PROGRESS NOTES
CLINICAL PHARMACY NOTE: MEDS TO BEDS    Total # of Prescriptions Filled: 2   The following medications were delivered to the patient:      Additional Documentation:  Current Discharge Medication List        START taking these medications    Details   enoxaparin (LOVENOX) 40 MG/0.4ML Inject 0.4 mLs into the skin in the morning and 0.4 mLs in the evening. Do all this for 14 days. Qty: 28 each, Refills: 0      oxyCODONE-acetaminophen (PERCOCET) 5-325 MG per tablet Take 1 tablet by mouth every 6 hours as needed for Pain for up to 7 days.  Max Daily Amount: 4 tablets  Qty: 28 tablet, Refills: 0    Comments: Reduce doses taken as pain becomes manageable  Associated Diagnoses: Post-op pain

## 2023-10-27 NOTE — PROGRESS NOTES
2000-Bedside report received from [facility], [address] [place] [address]. Pt A & O X 4. Pain at 10, RN to call as pt was recently medicated for pain, will follow up. Pt ambulated to the BR. Pt without any issues. Call light within reach. 2026-Pt resting in bed at this time. IV site to L FA  patent and intact. Pt A & O x 4. LS clear, on RA. Drsgs to abd CDI. + CSM. Pain at 10, morphine given, per day RN, DR LUO said ok to give 6 mgs of Morphine. Hypo BS to all 4 quadrants. Pt denies nausea. BING with wiley sorensen. Call light within reach. Pt denies any needs at this time. 2245-Pt ambulated to the BR. Nausea meds given too. Pt had an uneventful shift. Pain meds given as needed. No issues/concerns at this time.  Call bell within reach

## 2023-10-27 NOTE — DISCHARGE SUMMARY
Discharge Summary    Patient: Dipesh Hightower               Sex: female          DOA: 10/26/2023         YOB: 1980      Age:  37 y.o.        LOS:  LOS: 1 day                Admit Date: 10/26/2023    Discharge Date: 10/27/2023    Admission Diagnoses: Essential hypertension, malignant [I10]  Morbid obesity ([address]) [E66.01]  Body mass index 40.0-44.9, adult ([address]) [Z68.41]  Severe obesity (BMI 35.0-39. 9) with comorbidity ([address]) [E66.01]    Discharge Diagnoses:      Discharge Condition: Good    Hospital Course: The patient underwent  sleeve resection  on 10/26/2023. The patient tolerated the procedure well. Vital signs remained stable and the patient was transferred to  3rd floor surgical unit without complications. The patient remained stable throughout the first night post operatively with stable vital signs and adequate urine output and pain control. Pain was controlled with Dilaudid IV and IV Tylenol . The patient on the first morning post operative was transferred to the radiology suite where they underwent a gastrograffin UGI study which showed no evidence of a leak or stricture. The drain was discontinued on POD # 1 and the patient was started on a bariatric liquid diet with protein shakes. The patient progressed throughout the day and was ambulating well and tolerating their diet. They were therefore discharged home with instructions to notify me with any issues that may arise. Significant Diagnostic Studies:   No results for input(s): \"HGB\" in the last 72 hours. No results for input(s): \"HCT\" in the last 72 hours. Medication List        START taking these medications      enoxaparin 40 MG/0.4ML  Commonly known as: LOVENOX  Inject 0.4 mLs into the skin in the morning and 0.4 mLs in the evening. Do all this for 14 days.      ondansetron 4 MG disintegrating tablet  Commonly known as: ZOFRAN-ODT     oxyCODONE-acetaminophen 5-325 MG per tablet  Commonly known as: Percocet  Take 1 tablet by

## 2023-10-27 NOTE — PROCEDURES
[facility]    Upper GI Procedure Report      Chelle Huynh    Medical Record [id number]    1980    Date of Service - October 27, 2023    Pre-Op Diagnosis - patient is status post sleeve resection performed by myself 24 hours ago. They now present for UGI to assess their post surgical anatomy. Post-Op Diagnosis -same    Procedure - UGI study with gastrografin    Surgeon - Darryl Flores MD    Assistant - None    Complications - None    Specimens - None    Implants - None    Estimate Blood Loss - None    Statement of Medical Necessity - Need for radiologic evaluation prior to further management of their care. .    Procedure -the patient was brought to the fluoroscopy suite where they were given gastrografin. On swallowing the contrast the patient was noted to have normal peristalsis of their esophagus with progressive flow into the distal esophagus. Specific findings of the distal esophagus revealed that they did not have a hiatal hernia. Contrast flowed normally through the esophagus and into a properly sized sleeve stomach without reflux or obstruction or signs of stricture. The stomach filled in a timely manner and emptied into the duodenum without issue or hesitation. The anatomy was normal for the timeframe with no stricture or obstruction or any other abnormality. Given the benign findings of today's exam we will proceed with liquid diet and start PO medications.     Darryl Flores MD

## 2023-10-28 NOTE — PROGRESS NOTES
1015 - Assessment completed. Patient is alert and oriented x 4. Lung sounds clear bilaterally. Respirations even and unlabored. No use of accessory muscles. Abdomen is obese and tender. Bowel sounds active to all quadrants. Patient reports nausea and vomiting. Patient voiding without difficulty. Skin is warm, dry and skin color is appropriate to race. Steri-strips intact to trocar sites x 5. Gauze dressing to BING drain site noted CDI. No other skin integrity issues present. Victorino hose to BLE. Sequential compression device applied to BLE. IV intact to left FA and infusing without difficulty. Reports pain 8/10. PRN pain medication administered. Ice packs applied. Patient oriented to phone and how to order meals. Call bell within reach. Bed in low position. Three side rails up. 1024 - PRN zofran administered for nausea and vomiting. 7114 - PRN Mylicon administered for gas/cramping. PRN phenergan administered for unrelieved nausea/vomiting. 1427 - PRN percocet administered for pain during transport home. BING drain removed as per MD order. Gauze and tegaderm applied.

## 2023-10-30 ENCOUNTER — TELEPHONE (OUTPATIENT)
Age: 43
End: 2023-10-30

## 2023-10-30 NOTE — TELEPHONE ENCOUNTER
This RN spoke with patient post-operatively. Hydration: Patient hydrated with 64 ounces yesterday. Nausea and/or vomitting: None    Pain: Currently managed with Percocet. Patient is experiencing gassiness, so RN recommended Gas X. Lovenox injections: Administering every 12 hours, rotating sites. RN reminded patient to complete all injections, in which patient verbalized understanding. Lap sites: No erythema, drainage, and/or swelling    BING drain site: No drainage; dressing being changed daily    BM: None to date, but is passing flatus. Ambulation: Patient is walking throughout house every hour. IS: Patient continues to use 10x's per hour while awake. She has reached 1,500 mL. Temperature: 98.6 degrees    Medications: (confirmed currently taking)   *Multi-vitamin: yes   *Probiotic: yes   *Famotidine: yes    Questions: None    This RN reminded the patient to contact the office with any questions and/or concerns. Patient verbalized understanding. Patient's two week post-op visit is scheduled and was confirmed.

## 2023-11-07 ENCOUNTER — HOSPITAL ENCOUNTER (OUTPATIENT)
Facility: HOSPITAL | Age: 43
Discharge: HOME | End: 2023-11-10

## 2023-11-07 NOTE — PROGRESS NOTES
Spoke with Edelmira Lazaro today. Pt is approx. 2 weeks S/P laparoscopic sleeve gastrectomy. Tolerating liquid diet with some difficulty. Complains of nausea and heaviness or \"stuck\" feeling in chest behind breastbone with protein shakes. Taking zofran BID. Protein shake intake: Ensure Max (1-2/d). Fluid intake: water, Gatorade Zero, vegetable/chicken broth, SF jell-O, SF popsicles, SF pudding, Greek yogurt ([employer]ikos Triple Zero), spinach \"juice\" (fresh spinach blended with water) - 56-61 oz/d. Foods being consumed include: no. No complaints. No readmits/surgeries. Wt: 232 lbs. (Pt stated, per home scale)  Pre-Op Wt: 244 lbs. EBW: 94 lbs. Physical Activity: walking 30-45 min, 3-4x/d, 5-7x/wk OR climbing stairs (3 flights, 3x/d)    Supplements/Medications:  [x] Polo's Complete Chewable MVI BID  [x] Probiotic QD  [x] Prilosec QD    Pt given diet education over the phone. Reviewed diet progression for weeks 3-4. Patient appears to have a good understanding of the diet progression, appropriate food choices, and dietary/exercise habits for successful weight loss and adequate nutrition after surgery. Reviewed pp. 32-46 of the patient education book. Discussed: post-op diet progression, including soft/pureed high protein, low-fat, low-sugar food recommendations; proper food group choices;  consumption of food and liquids, and consumption of adequate no-sugar, no caffeine, no carbonation liquids. We reviewed appropriate food choices, meal adaptations (use of smaller dishes/utensils, eating slowly and chewing sufficiently for digestion), cooking techniques, and eating behavior modifications. Discussed intake regimen with 3 meals and 2-3 protein supplements per day. Additionally, meal timing - to include consuming a meal or snack every 3-4 hrs, the 30:30 rule, and having a meal within 2 hours of waking were discussed.  Reinforced the importance of continued vitamin & probiotic consumption,

## 2023-11-08 ENCOUNTER — OFFICE VISIT (OUTPATIENT)
Age: 43
End: 2023-11-08

## 2023-11-08 VITALS
SYSTOLIC BLOOD PRESSURE: 131 MMHG | OXYGEN SATURATION: 100 % | HEART RATE: 86 BPM | BODY MASS INDEX: 37.22 KG/M2 | HEIGHT: 66 IN | DIASTOLIC BLOOD PRESSURE: 72 MMHG | WEIGHT: 231.6 LBS | TEMPERATURE: 97.1 F

## 2023-11-08 DIAGNOSIS — Z98.84 S/P LAPAROSCOPIC SLEEVE GASTRECTOMY: ICD-10-CM

## 2023-11-08 DIAGNOSIS — R89.7 ABNORMAL BIOPSY RESULT: ICD-10-CM

## 2023-11-08 DIAGNOSIS — Z09 POSTOPERATIVE EXAMINATION: Primary | ICD-10-CM

## 2023-11-08 PROCEDURE — 99024 POSTOP FOLLOW-UP VISIT: CPT | Performed by: NURSE PRACTITIONER

## 2023-11-08 RX ORDER — OMEPRAZOLE 20 MG/1
20 CAPSULE, DELAYED RELEASE ORAL
Qty: 60 CAPSULE | Refills: 2 | Status: SHIPPED | OUTPATIENT
Start: 2023-11-08

## 2023-11-08 RX ORDER — OMEPRAZOLE 20 MG/1
20 CAPSULE, DELAYED RELEASE ORAL DAILY
COMMUNITY

## 2023-11-08 RX ORDER — ONDANSETRON 4 MG/1
4 TABLET, ORALLY DISINTEGRATING ORAL EVERY 8 HOURS PRN
Qty: 30 TABLET | Refills: 1 | Status: SHIPPED | OUTPATIENT
Start: 2023-11-08

## 2023-11-08 RX ORDER — URSODIOL 500 MG/1
500 TABLET, FILM COATED ORAL DAILY
Qty: 30 TABLET | Refills: 4 | Status: SHIPPED | OUTPATIENT
Start: 2023-11-08

## 2023-11-27 ENCOUNTER — HOSPITAL ENCOUNTER (OUTPATIENT)
Facility: HOSPITAL | Age: 43
Discharge: HOME | End: 2023-11-30

## 2023-11-27 VITALS — HEIGHT: 65 IN | BODY MASS INDEX: 37.49 KG/M2 | WEIGHT: 225 LBS

## 2024-01-31 ENCOUNTER — HOSPITAL ENCOUNTER (OUTPATIENT)
Facility: HOSPITAL | Age: 44
Discharge: HOME | End: 2024-02-03

## 2024-01-31 ENCOUNTER — OFFICE VISIT (OUTPATIENT)
Age: 44
End: 2024-01-31
Payer: MEDICAID

## 2024-01-31 VITALS
DIASTOLIC BLOOD PRESSURE: 60 MMHG | TEMPERATURE: 97.9 F | SYSTOLIC BLOOD PRESSURE: 126 MMHG | HEIGHT: 65 IN | BODY MASS INDEX: 33.29 KG/M2 | WEIGHT: 199.8 LBS | OXYGEN SATURATION: 100 % | HEART RATE: 68 BPM

## 2024-01-31 DIAGNOSIS — R13.10 DYSPHAGIA, UNSPECIFIED TYPE: ICD-10-CM

## 2024-01-31 DIAGNOSIS — Z98.84 S/P LAPAROSCOPIC SLEEVE GASTRECTOMY: ICD-10-CM

## 2024-01-31 DIAGNOSIS — K21.9 GASTROESOPHAGEAL REFLUX DISEASE, UNSPECIFIED WHETHER ESOPHAGITIS PRESENT: ICD-10-CM

## 2024-01-31 DIAGNOSIS — F12.90 MARIJUANA USE: ICD-10-CM

## 2024-01-31 DIAGNOSIS — K90.9 INTESTINAL MALABSORPTION, UNSPECIFIED TYPE: Primary | ICD-10-CM

## 2024-01-31 DIAGNOSIS — D64.9 ANEMIA, UNSPECIFIED TYPE: ICD-10-CM

## 2024-01-31 PROBLEM — E66.01 MORBID OBESITY WITH BMI OF 40.0-44.9, ADULT (HCC): Status: RESOLVED | Noted: 2022-12-19 | Resolved: 2024-01-31

## 2024-01-31 PROBLEM — E66.01 MORBID OBESITY (HCC): Status: RESOLVED | Noted: 2022-12-19 | Resolved: 2024-01-31

## 2024-01-31 PROBLEM — E66.01 SEVERE OBESITY (BMI 35.0-39.9) WITH COMORBIDITY (HCC): Status: RESOLVED | Noted: 2023-10-26 | Resolved: 2024-01-31

## 2024-01-31 LAB — SENTARA SPECIMEN COLLECTION: NORMAL

## 2024-01-31 PROCEDURE — 99214 OFFICE O/P EST MOD 30 MIN: CPT | Performed by: NURSE PRACTITIONER

## 2024-01-31 PROCEDURE — 3074F SYST BP LT 130 MM HG: CPT | Performed by: NURSE PRACTITIONER

## 2024-01-31 PROCEDURE — 3078F DIAST BP <80 MM HG: CPT | Performed by: NURSE PRACTITIONER

## 2024-01-31 RX ORDER — NITROGLYCERIN 0.3 MG/1
0.3 TABLET SUBLINGUAL EVERY 5 MIN PRN
COMMUNITY
Start: 2023-11-08 | End: 2024-11-07

## 2024-01-31 RX ORDER — ONDANSETRON 4 MG/1
TABLET, FILM COATED ORAL
COMMUNITY
Start: 2024-01-16

## 2024-01-31 RX ORDER — POLYETHYLENE GLYCOL 3350 17 G/17G
POWDER, FOR SOLUTION ORAL
COMMUNITY
Start: 2023-12-15

## 2024-01-31 RX ORDER — ASCORBIC ACID 500 MG
500 TABLET ORAL DAILY
COMMUNITY
Start: 2023-09-15

## 2024-01-31 RX ORDER — PHENAZOPYRIDINE HYDROCHLORIDE 200 MG/1
TABLET, FILM COATED ORAL
COMMUNITY
Start: 2024-01-25

## 2024-01-31 RX ORDER — ATORVASTATIN CALCIUM 20 MG/1
20 TABLET, FILM COATED ORAL DAILY
COMMUNITY
Start: 2023-12-07 | End: 2024-12-06

## 2024-01-31 RX ORDER — AZELASTINE HYDROCHLORIDE 137 UG/1
SPRAY, METERED NASAL
COMMUNITY
Start: 2023-10-16

## 2024-01-31 RX ORDER — METOCLOPRAMIDE 10 MG/1
10 TABLET ORAL 4 TIMES DAILY
Qty: 40 TABLET | Refills: 0 | Status: SHIPPED | OUTPATIENT
Start: 2024-01-31 | End: 2024-02-10

## 2024-01-31 RX ORDER — CYCLOBENZAPRINE HCL 10 MG
TABLET ORAL
COMMUNITY
Start: 2023-08-31

## 2024-01-31 RX ORDER — SUCRALFATE 1 G/1
1 TABLET ORAL 4 TIMES DAILY
Qty: 120 TABLET | Refills: 1 | Status: SHIPPED | OUTPATIENT
Start: 2024-01-31 | End: 2024-01-31

## 2024-01-31 RX ORDER — TRAZODONE HYDROCHLORIDE 50 MG/1
50 TABLET ORAL NIGHTLY PRN
COMMUNITY
Start: 2023-08-31 | End: 2024-08-30

## 2024-01-31 RX ORDER — SUCRALFATE 1 G/1
1 TABLET ORAL
Qty: 120 TABLET | Refills: 1 | Status: SHIPPED | OUTPATIENT
Start: 2024-01-31

## 2024-01-31 RX ORDER — CHOLECALCIFEROL (VITAMIN D3) 50 MCG
TABLET ORAL
COMMUNITY
Start: 2024-01-12

## 2024-01-31 RX ORDER — DICYCLOMINE HYDROCHLORIDE 10 MG/1
CAPSULE ORAL
COMMUNITY
Start: 2024-01-16

## 2024-01-31 ASSESSMENT — PATIENT HEALTH QUESTIONNAIRE - PHQ9
2. FEELING DOWN, DEPRESSED OR HOPELESS: 0
SUM OF ALL RESPONSES TO PHQ QUESTIONS 1-9: 0
SUM OF ALL RESPONSES TO PHQ9 QUESTIONS 1 & 2: 0
1. LITTLE INTEREST OR PLEASURE IN DOING THINGS: 0

## 2024-01-31 NOTE — PATIENT INSTRUCTIONS
Patient Instructions      Remember hydration goals - minimum of 64 ounces of liquids per day (dehydration is the number one reason for hospital readmission).  Continue to monitor carbohydrate and protein intake you need a minimum of  Grams of protein daily- remember to keep your total carbohydrates to 50 grams or less per day for best results.  Continue to work towards exercise goals - 60-90 minutes, 5 times a week minimum of deliberate, aerobic exercise is the ultimate goal with strength training 2 times each week. Refer to [facility] for  information.  Remember to take vitamins as directed.  Attend support group the 2nd Thursday of each month.  6.  Constipation: Milk of Magnesia is for immediate relief only.  Miralax is to be used every day if constipation is a chronic problem.    7.  Diarrhea: patients will occasionally develop lactose intolerance after surgery.  Check to see if your protein shake has whey in it.  If it does try a protein powder or drink that does not have whey and stop all yogurts, cheeses and milks to see if the diarrhea goes away.    8.  If you have had labs drawn.  We will only call you if you have abnormal results.  Otherwise you can access the lab results in \"Ap[facility]\".  You will only need the access code the first time you sign on.    9.  Call us at [phone] or email us through \"Apieron\" with questions,     concerns or worsening of condition, we have someone on call 24 hours a day.  If you are unable to reach our office, you are to go to your Primary Care Physician or the Emergency Department.     NOTE TO GASTRIC BYPASS PATIENTS:  (SAME APPLIES TO GASTRIC SLEEVE PATIENTS FOR FIRST TWO MONTHS)  Remember that for the rest of your life, you are not able to take the following:  - NSAIDs (ibuprofen, goody powder, BC powder, Motrin, Advil, Mobic, Voltaren, Excedrin, etc.)  - Steroid pills or injections  - Smoke (cigarettes or recreational drugs)  - Alcohol  Use of

## 2024-01-31 NOTE — PROGRESS NOTES
fracture.  No aggressive osseous lesion identified.     Assessment and Plan:   Intestinal malabsorption  continue required Vitamins: B12, B complex, D, iron, calcium, multivitamin  S/p laparoscopic bariatric surgery,laparoscopic sleeve gastrectomy , history of morbid obesity  Sleep goal is 7-9 hours each night. Patient education given on the effects of sleep deprivation on weight control.  Discussed patients weight loss goals and dietary choices in relation to goals.  Reminded to measure portions, continue high protein, low carbohydrate diet.  Reminded to eat regularly, to eat slowly & not to drink with meals.    Continue cardio exercise and add resistance exercises. 60-90 minutes of aerobic activity 5 days a week and strength training 2 days each week.  Encouraged to attend support group   Required fluid intake is >64oz daily of decaffeinated sugar free beverages.   3. Dysphagia - suggestive of possible ulcer, will send carafate slurry QID, continue PPI. Avoid spicy/acidic foods. Aware she must stop smoking marijuana. Send reglan as well for her nausea and vomiting.  4. Anemia - f/up OBGYN to stop heavy menses, check iron and ferritin  5. Abnormal liver biopsy - awaiting appt with [facility], referral placed 11/8/23  6. GERD - continue PPI    Patient to complete labs before next visit.  Lab slip given today.  I have discussed this plan with patient and they verbalized understanding    30 minutes spent with patient.    Follow up in after imaging and in 3 months or sooner if patient has questions, concerns or worsening of condition, if unable to reach our office, patient should report to the ED.    Ms. Robertson has a reminder for a \"due or due soon\" health maintenance. I have asked that she contact her primary care provider for a follow-up on this health maintenance.

## 2024-02-01 LAB
FERRITIN: 37 NG/ML (ref 10–291)
FOLATE: >20 NG/ML
IRON: 45 MCG/DL (ref 30–160)
VITAMIN B-12: 914 PG/ML (ref 211–911)
VITAMIN D 25-HYDROXY: 42.4 NG/ML (ref 32–100)

## 2024-02-06 LAB
THIAMINE BLOOD: 76 NMOL/L (ref 78–185)
THIAMINE BLOOD: ABNORMAL

## 2024-02-07 ENCOUNTER — HOSPITAL ENCOUNTER (OUTPATIENT)
Facility: HOSPITAL | Age: 44
Setting detail: OUTPATIENT SURGERY
Discharge: HOME | End: 2024-02-10
Payer: MEDICAID

## 2024-02-07 ENCOUNTER — HOSPITAL ENCOUNTER (OUTPATIENT)
Facility: HOSPITAL | Age: 44
Discharge: HOME | End: 2024-02-10
Payer: MEDICAID

## 2024-02-07 VITALS
BODY MASS INDEX: 33.41 KG/M2 | WEIGHT: 200.5 LBS | OXYGEN SATURATION: 100 % | SYSTOLIC BLOOD PRESSURE: 128 MMHG | HEIGHT: 65 IN | DIASTOLIC BLOOD PRESSURE: 75 MMHG | RESPIRATION RATE: 18 BRPM | TEMPERATURE: 98 F | HEART RATE: 65 BPM

## 2024-02-07 DIAGNOSIS — E66.01 MORBID OBESITY (HCC): ICD-10-CM

## 2024-02-07 PROCEDURE — 74240 X-RAY XM UPR GI TRC 1CNTRST: CPT | Performed by: SPECIALIST

## 2024-02-07 PROCEDURE — 74220 X-RAY XM ESOPHAGUS 1CNTRST: CPT

## 2024-02-07 PROCEDURE — 74240 X-RAY XM UPR GI TRC 1CNTRST: CPT

## 2024-02-07 PROCEDURE — 2500000003 HC RX 250 WO HCPCS: Performed by: SPECIALIST

## 2024-02-07 RX ADMIN — BARIUM SULFATE 100 ML: 960 POWDER, FOR SUSPENSION ORAL at 14:56

## 2024-02-07 NOTE — PROGRESS NOTES
[facility] UGI FOCUS NOTE      Date:  2/7/2024  Time:  2:46 PM    Patient:  Stephanie Robertson  Procedure:  UGI      Patient Questions  Lap Band Adjustment Patient Questionnaire:  If female, are you pregnant? []Yes     [x]No  Tolerates thick liquids:  []Well   []1     [x]2     []3     []4     []5     []Poorly  Tolerates red meat:  []Well   []1     []2     []3     []4     []5     [] Poorly  Tolerates chicken:  []Well   []1     [x]2     []3     []4     []5     []Poorly  Tolerates fish:   []Well   []1     [x]2     []3     []4     []5     []Poorly  Hunger is:   []Well Controlled     []1     []2     [x]3     []4     []5      [] Poorly Controlled  Nightime regurgitation:  []Never     [x]1     []2     []3     []4     []5     []Frequently    Lap Band Info:  Band Type  []Realize     []Realize-C     []AP     []VG     []10cm     []Unknown  []Other      Comments:        Dinah Earl RN

## 2024-02-07 NOTE — PROCEDURES
[facility]    Upper GI Procedure Report      Stephanie Robertson    Medical Record Number:[id number]    1980    Date of Service - February 7, 2024    Pre-Op Diagnosis - patient is status post sleeve resection performed by this office 3 months ago with recent office encounter with the following notation - reports vomiting, nausea, lower pelvic abdominal pain, difficulty swallowing and reflux.  They now present for UGI to assess their post surgical anatomy.    Post-Op Diagnosis -same    Procedure - UGI study with barium    Surgeon - Yuriy Mcgee MD    Assistant - None    Complications - None    Specimens - None    Implants - None    Estimate Blood Loss - None    Statement of Medical Necessity - Need for radiologic evaluation prior to further management of their care..    Procedure -the patient was brought to the fluoroscopy suite where they were given thin barium.  On swallowing the barium the patient was noted to have normal peristalsis of their esophagus with progressive flow into the distal esophagus.  Specific findings of the distal esophagus revealed that they did note have a hiatal hernia. Contrast flowed normally through the esophagus and into a properly sized sleeve stomach without reflux or obstruction or signs of stricture. The stomach filled in a timely manner and emptied into the duodenum without issue or hesitation. The anatomy was normal for the timeframe with no stricture or obstruction or any other abnormality.  Given the benign findings of today's exam we will educate her in proper post sleeve diet as well as the need for stop all THC use.    Yuriy Mcgee MD

## 2024-02-12 RX ORDER — THIAMINE MONONITRATE (VIT B1) 100 MG
100 TABLET ORAL DAILY
Qty: 30 TABLET | Refills: 2 | Status: SHIPPED | OUTPATIENT
Start: 2024-02-12

## 2024-02-21 RX ORDER — OMEPRAZOLE 20 MG/1
20 CAPSULE, DELAYED RELEASE ORAL 2 TIMES DAILY
Qty: 60 CAPSULE | Refills: 3 | Status: SHIPPED | OUTPATIENT
Start: 2024-02-21

## 2024-04-26 ENCOUNTER — HOSPITAL ENCOUNTER (OUTPATIENT)
Facility: HOSPITAL | Age: 44
Discharge: HOME | End: 2024-04-29

## 2024-04-26 ENCOUNTER — OFFICE VISIT (OUTPATIENT)
Age: 44
End: 2024-04-26
Payer: MEDICAID

## 2024-04-26 VITALS
BODY MASS INDEX: 31.09 KG/M2 | TEMPERATURE: 97.1 F | OXYGEN SATURATION: 100 % | HEART RATE: 86 BPM | WEIGHT: 186.6 LBS | SYSTOLIC BLOOD PRESSURE: 106 MMHG | DIASTOLIC BLOOD PRESSURE: 62 MMHG | HEIGHT: 65 IN

## 2024-04-26 DIAGNOSIS — Z98.84 S/P LAPAROSCOPIC SLEEVE GASTRECTOMY: ICD-10-CM

## 2024-04-26 DIAGNOSIS — K90.9 INTESTINAL MALABSORPTION, UNSPECIFIED TYPE: Primary | ICD-10-CM

## 2024-04-26 DIAGNOSIS — R13.10 DYSPHAGIA, UNSPECIFIED TYPE: ICD-10-CM

## 2024-04-26 DIAGNOSIS — R10.13 EPIGASTRIC PAIN: ICD-10-CM

## 2024-04-26 DIAGNOSIS — K90.9 INTESTINAL MALABSORPTION, UNSPECIFIED TYPE: ICD-10-CM

## 2024-04-26 LAB — SENTARA SPECIMEN COLLECTION: NORMAL

## 2024-04-26 PROCEDURE — 3074F SYST BP LT 130 MM HG: CPT | Performed by: NURSE PRACTITIONER

## 2024-04-26 PROCEDURE — 3078F DIAST BP <80 MM HG: CPT | Performed by: NURSE PRACTITIONER

## 2024-04-26 PROCEDURE — 99001 SPECIMEN HANDLING PT-LAB: CPT

## 2024-04-26 PROCEDURE — 99214 OFFICE O/P EST MOD 30 MIN: CPT | Performed by: NURSE PRACTITIONER

## 2024-04-26 RX ORDER — LEVETIRACETAM 250 MG/1
30 TABLET, FILM COATED ORAL EVERY 6 HOURS PRN
COMMUNITY
Start: 2024-04-21

## 2024-04-26 RX ORDER — HALOPERIDOL 0.5 MG/1
0.5 TABLET ORAL EVERY 6 HOURS PRN
COMMUNITY
Start: 2024-04-21

## 2024-04-26 RX ORDER — SUCRALFATE 1 G/1
1 TABLET ORAL
Qty: 120 TABLET | Refills: 1 | Status: SHIPPED | OUTPATIENT
Start: 2024-04-26

## 2024-04-26 RX ORDER — PANTOPRAZOLE SODIUM 40 MG/1
40 TABLET, DELAYED RELEASE ORAL 2 TIMES DAILY
COMMUNITY
Start: 2024-03-01

## 2024-04-26 NOTE — PROGRESS NOTES
lungs every 6 hours as needed for Wheezing or Shortness of Breath      Prenatal Multivit-Min-Fe-FA (PRENATAL/IRON PO) Take by mouth      cetirizine (ZYRTEC) 10 MG tablet Take 1 tablet by mouth daily Indications: Allergies      fluticasone (FLONASE) 50 MCG/ACT nasal spray 1 spray by Nasal route daily Indications: Allergies      topiramate (TOPAMAX) 50 MG tablet Take 1 tablet by mouth 2 times daily Indications: migranes      venlafaxine (EFFEXOR XR) 75 MG extended release capsule Take 1 capsule by mouth every morning Indications: bipolar      metoclopramide (REGLAN) 10 MG tablet Take 1 tablet by mouth 4 times daily for 10 days 40 tablet 0     No current facility-administered medications for this visit.       Review of Systems:  General - Denies fatigue, fever, chills  Cardiac - Denies chest pain, palpitations, shortness of breath  Pulmonary - Denies shortness of breath or productive cough  Gastrointestinal - as noted above  Musculoskeletal - Denies joint or muscle weakness, pain, stiffness  Hematologic - Denies abnormal bleeding or bruising  Neurologic - Denies weakness, paralysis, numbness, tingling    Objective:     /62 (Site: Left Upper Arm, Position: Sitting, Cuff Size: Large Adult)   Pulse 86   Temp 97.1 °F (36.2 °C)   Ht 1.651 m (5' 5\")   Wt 84.6 kg (186 lb 9.6 oz)   SpO2 100%   BMI 31.05 kg/m²      Physical Exam:      General appearance:  alert, cooperative, no distress, appears stated age   Mental status   alert, oriented to person, place, and time   Neck  supple, no significant adenopathy     Lymphatics  no palpable lymphadenopathy, no hepatosplenomegaly   Chest  clear to auscultation, no wheezes, rales or rhonchi, symmetric air entry   Heart  normal rate, regular rhythm, normal S1, S2, no murmurs, rubs, clicks or gallops    Abdomen: soft, + tender subxiphoid, nondistended, no masses or organomegaly   Incision:  Well healed, no hernias      Neurological  alert, oriented, normal speech, no focal

## 2024-04-26 NOTE — PATIENT INSTRUCTIONS
any of the above may cause ulcers in your stomach which may perforate causing a medical emergency and surgery. Speak to our medical staff if another medical provider requires you to take steroids or NSAIDs.          Supplement Resource Guide    Importance of Protein:   Maintains lean body mass, produces antibodies to fight off infections, heals wounds, minimizes hair loss, helps to give you energy, helps with satiety, and keeping you full between meals.    Importance of Calcium:  Needed for healthy bones and teeth, normal blood clotting, and nervous system functioning, higher risk of osteoporosis and bone disease with non-compliance.    Importance of Multivitamins:  Many functions.  Supply you with extra nutrients that you may be missing from food.  May lead to iron deficiency anemia, weakness, fatigue, and many other symptoms with non-compliance.    Importance of B Vitamins:  Important for red blood cell formation, metabolism, energy, and helps to maintain a healthy nervous system.    Protein Supplement  Find one you like now. Use immediately after surgery.   Look for:  35-50g protein each day from your protein supplement once you reach the progression diet.      0-3 g fat per serving  0-3 g sugar per serving    Protein drinks should be split in separate dosages.    Recommend: Lifelong  1 year + Calcium Supplement:     Start taking within a month after surgery.   Look for: Calcium Citrate Plus D (1500 mg per day)  Recommend: Citracal     .            Avoid chocolate chewable calcium. Can use chewable bariatric or [employer] brand or similar chewable.    The body cannot absorb more than 500-600 mg of calcium at a time.      Take for Life Multi-vitamin Supplement:      Start immediately after surgery: any complete chewable, such as: [employer]’s Complete chewables.    Avoid [employer]lake sours or gummies.  They lack iron and other important nutrients and also have added sugar.    Continue with chewable vitamin or change to

## 2024-04-29 LAB
FOLATE: 18.9 NG/ML
VITAMIN D 25-HYDROXY: 34.5 NG/ML (ref 32–100)

## 2024-04-30 LAB — THIAMINE BLOOD: 97 NMOL/L (ref 78–185)

## 2024-05-01 ENCOUNTER — TELEPHONE (OUTPATIENT)
Age: 44
End: 2024-05-01

## 2024-05-01 NOTE — TELEPHONE ENCOUNTER
Pt is aware /am     ----- Message from ERIKA Gilbert NP sent at 4/30/2024  8:05 AM EDT -----  Please call patient regarding abnormal result.  Vitamin D is low. Needs to increase her D3 to 5000 units daily

## 2024-05-13 ENCOUNTER — TELEMEDICINE (OUTPATIENT)
Age: 44
End: 2024-05-13
Payer: MEDICAID

## 2024-05-13 DIAGNOSIS — K21.9 GASTROESOPHAGEAL REFLUX DISEASE, UNSPECIFIED WHETHER ESOPHAGITIS PRESENT: ICD-10-CM

## 2024-05-13 DIAGNOSIS — Z98.84 S/P LAPAROSCOPIC SLEEVE GASTRECTOMY: ICD-10-CM

## 2024-05-13 DIAGNOSIS — F12.90 MARIJUANA USE: ICD-10-CM

## 2024-05-13 DIAGNOSIS — R10.13 EPIGASTRIC PAIN: ICD-10-CM

## 2024-05-13 DIAGNOSIS — R13.10 DYSPHAGIA, UNSPECIFIED TYPE: Primary | ICD-10-CM

## 2024-05-13 PROCEDURE — 99214 OFFICE O/P EST MOD 30 MIN: CPT | Performed by: SPECIALIST

## 2024-05-13 NOTE — PROGRESS NOTES
EGD - History and Physical  Telemedicine Encounter    Subjective:     The patient is a 43 y.o. obese female who is 6.5 months post sleeve resection with recent office encounter with the following notation;     Dysphagia - worsened, now with epigastric tenderness on exam. Suggestive of gastric ulcer after NSAID use along with continued smoking. Never started carafate sent after last visit. Resent carafate slurry QID. Keep PPI BID. Had CT imaging scheduled with GI, but will proceed with getting EGD. Patient verbalized understanding that she MUST stop smoking and absolutely no more NSAIDs.     After seeing our NP she had a work-up via [facility] (documents unable to reviewed at the time of this note) which happened to coincide with a CT that was ordered by [facility] in response to some known enlarged lymph nodes.  The results of this CT scan are pasted below.    Impression  1. Multiple small lymph nodes are noted in the retroperitoneum, hepatic hilum, and bilateral inguinal area.  Metastatic lesion cannot be excluded.  2. Multiple tiny low-attenuation lesions are noted in the spleen and liver, not significant changed from previous study.  Further evaluation with CT of the thorax is recommended  3. Small pulmonary nodules in both lung bases are slightly increased in size from previous study..  4. Small hiatus hernia    Patient Active Problem List    Diagnosis Date Noted    Epigastric pain 04/26/2024    Intestinal malabsorption 01/31/2024    S/P laparoscopic sleeve gastrectomy 01/31/2024    Dysphagia 01/31/2024    Diaphragmatic hernia without obstruction and without gangrene 10/26/2023    Marijuana use 06/21/2023    Smoking history 06/21/2023    Anxiety 06/21/2023    Sleep disorder 06/21/2023    Anemia 05/19/2023    Heavy menses 05/19/2023    Hypertension 12/19/2022    Chronic back pain 12/19/2022    Arthritis of both knees 12/19/2022    GERD (gastroesophageal reflux disease) 12/19/2022      Past Surgical History:

## 2024-05-15 NOTE — PATIENT INSTRUCTIONS
increase your chances of quitting for good.  Limit alcohol to 2 drinks a day for men and 1 drink a day for women. Too much alcohol can cause health problems.  If you have a BMI higher than 25  Your doctor may do other tests to check your risk for weight-related health problems. This may include measuring the distance around your waist. A waist measurement of more than 40 inches in men or 35 inches in women can increase the risk of weight-related health problems.  Talk with your doctor about steps you can take to stay healthy or improve your health. You may need to make lifestyle changes to lose weight and stay healthy, such as changing your diet and getting regular exercise.  If you have a BMI lower than 18.5  Your doctor may do other tests to check your risk for health problems.  Talk with your doctor about steps you can take to stay healthy or improve your health. You may need to make lifestyle changes to gain or maintain weight and stay healthy, such as getting more healthy foods in your diet and doing exercises to build muscle.  Where can you learn more?  Go to http://www.Triptease.net/Leap Commerce[employer]Connections.  Enter S176 in the search box to learn more about \"Body Mass Index: Care Instructions.\"  Current as of: June 26, 2018  Content Version: 11.8  © 3106-8546 [employer]. Care instructions adapted under license by [employer] (which disclaims liability or warranty for this information). If you have questions about a medical condition or this instruction, always ask your healthcare professional. [employer] disclaims any warranty or liability for your use of this information.

## 2024-08-02 ENCOUNTER — TELEPHONE (OUTPATIENT)
Age: 44
End: 2024-08-02

## 2024-08-02 NOTE — TELEPHONE ENCOUNTER
Pt aware we got omeprazole refill request from pharmacy and Tamara NP advised pt should only be taking Pantoprazole bid and Dicyclomine and not omeprazole, request denied and pt expresses understanding.

## 2024-08-19 ENCOUNTER — HOSPITAL ENCOUNTER (OUTPATIENT)
Facility: HOSPITAL | Age: 44
Setting detail: SPECIMEN
Discharge: HOME | End: 2024-08-22

## 2024-08-19 ENCOUNTER — OFFICE VISIT (OUTPATIENT)
Age: 44
End: 2024-08-19

## 2024-08-19 VITALS
HEIGHT: 65 IN | BODY MASS INDEX: 29.29 KG/M2 | OXYGEN SATURATION: 99 % | HEART RATE: 67 BPM | DIASTOLIC BLOOD PRESSURE: 82 MMHG | SYSTOLIC BLOOD PRESSURE: 131 MMHG | WEIGHT: 175.8 LBS | TEMPERATURE: 98.2 F

## 2024-08-19 DIAGNOSIS — K76.9 CHRONIC LIVER DISEASE: ICD-10-CM

## 2024-08-19 DIAGNOSIS — E78.00 HYPERCHOLESTEROLEMIA: Primary | ICD-10-CM

## 2024-08-19 DIAGNOSIS — Z98.84 S/P LAPAROSCOPIC SLEEVE GASTRECTOMY: ICD-10-CM

## 2024-08-19 PROBLEM — G43.909 MIGRAINE: Status: ACTIVE | Noted: 2024-08-19

## 2024-08-19 PROBLEM — K58.9 IBS (IRRITABLE BOWEL SYNDROME): Status: ACTIVE | Noted: 2024-08-19

## 2024-08-19 LAB — SENTARA SPECIMEN COLLECTION: NORMAL

## 2024-08-19 PROCEDURE — 99001 SPECIMEN HANDLING PT-LAB: CPT

## 2024-08-19 PROCEDURE — 99204 OFFICE O/P NEW MOD 45 MIN: CPT | Performed by: NURSE PRACTITIONER

## 2024-08-19 PROCEDURE — 3079F DIAST BP 80-89 MM HG: CPT | Performed by: NURSE PRACTITIONER

## 2024-08-19 PROCEDURE — 3075F SYST BP GE 130 - 139MM HG: CPT | Performed by: NURSE PRACTITIONER

## 2024-08-19 RX ORDER — FLUOCINOLONE ACETONIDE 0.11 MG/ML
OIL TOPICAL
COMMUNITY

## 2024-08-19 RX ORDER — GABAPENTIN 300 MG/1
CAPSULE ORAL
COMMUNITY
Start: 2024-05-17

## 2024-08-19 RX ORDER — PROMETHAZINE HYDROCHLORIDE 25 MG/1
25 SUPPOSITORY RECTAL EVERY 6 HOURS PRN
COMMUNITY
Start: 2024-07-02

## 2024-08-19 RX ORDER — METHOCARBAMOL 500 MG/1
500 TABLET, FILM COATED ORAL EVERY 8 HOURS PRN
COMMUNITY
Start: 2024-05-17

## 2024-08-19 RX ORDER — DIAZEPAM 5 MG/1
TABLET ORAL
COMMUNITY
Start: 2024-05-17

## 2024-08-19 RX ORDER — MEDROXYPROGESTERONE ACETATE 150 MG/ML
INJECTION, SUSPENSION INTRAMUSCULAR
COMMUNITY

## 2024-08-19 RX ORDER — FLUOCINOLONE ACETONIDE 0.11 MG/ML
OIL TOPICAL
COMMUNITY
Start: 2024-07-11 | End: 2024-08-19

## 2024-08-19 RX ORDER — FLUCONAZOLE 200 MG/1
TABLET ORAL
COMMUNITY
Start: 2024-07-11 | End: 2024-08-19

## 2024-08-19 RX ORDER — CICLOPIROX 1 G/100ML
SHAMPOO TOPICAL
COMMUNITY

## 2024-08-19 RX ORDER — OMEPRAZOLE 20 MG/1
CAPSULE, DELAYED RELEASE ORAL
COMMUNITY
Start: 2024-06-30

## 2024-08-19 RX ORDER — HYDROXYZINE HYDROCHLORIDE 25 MG/1
25 TABLET, FILM COATED ORAL 3 TIMES DAILY PRN
COMMUNITY
Start: 2024-07-23 | End: 2024-10-21

## 2024-08-19 RX ORDER — FLUCONAZOLE 200 MG/1
200 TABLET ORAL
COMMUNITY
Start: 2024-07-11 | End: 2024-08-19

## 2024-08-19 RX ORDER — NYSTATIN 100000 U/G
CREAM TOPICAL 2 TIMES DAILY PRN
COMMUNITY
Start: 2024-02-08 | End: 2024-08-19

## 2024-08-19 NOTE — PROGRESS NOTES
changes.  Neurologic: Alert and oriented.  Cranial nerves grossly intact.  No asterixis.    LABORATORY STUDIES:   Latest Ref Rng 7/26/2023 7/28/2023   [facility] - Routine Labs      WBC 4.0 - 11.0 K/uL 6.9  6.3    ANC 1.8 - 7.7 K/uL 3.9  3.2    HGB 11.7 - 16.0 g/dL 10.9 (L)  11.2 (L)     - 440 K/uL 250  267    AST 10 - 37 U/L 24  28    ALT 5 - 40 U/L 36  28    Alk Phos 25 - 115 U/L 108  104    Bili, Total 0.2 - 1.2 mg/dL 0.4  0.3    Albumin 3.5 - 5.0 g/dL 3.6  4.4    BUN 6 - 22 mg/dL 7  8    Creat 0.5 - 1.2 mg/dL 0.73  0.6    Na 133 - 145 mmol/L 140  141    K 3.5 - 5.5 mmol/L 3.8  4.1    Cl 98 - 110 mmol/L 109  103    CO2 20 - 32 mmol/L 25  26    Glucose 70 - 99 mg/dL 103 (H)  101 (H)    Magnesium 1.6 - 2.6 mg/dL 1.9     Hemoglobin A1C 4.8 - 5.6 %       SEROLOGIES:  Not available or performed.  Testing was performed today.    LIVER HISTOLOGY:  Not available or performed    ENDOSCOPIC PROCEDURES:  Not available or performed    RADIOLOGY:  05/2024. Abdominal/pelvic CT w IV contrast.  Multiple tiny low-attenuation lesions are noted in the spleen and liver, not significant changed from previous study.   08/2024. Abdominal/Pelvic CT w contrast.  Stable findings of cirrhosis.  Stable appearance of multiple subcentimeter hypodensities in the liver and spleen.     OTHER TESTING:  Not available or performed    FOLLOW-UP:  All of the issues listed above in the Assessment and Plan were discussed with the patient.  All questions were answered.  The patient expressed a clear understanding of the above.    Follow-up [facility] in 4 weeks to review all data and determine the treatment plan.  That appointment will include Fibroscan.    LELA Harry  [facility]  [address], [address]   [place] [address] [phone]

## 2024-08-28 LAB
A/G RATIO: 1.6 RATIO (ref 1.1–2.6)
AFP (ALPHA FETOPROTEIN) L3%: NORMAL % (ref 0.5–9.9)
AFP: 3.3 NG/ML (ref 1.6–4.5)
ALBUMIN: 4.1 G/DL (ref 3.5–5)
ALP BLD-CCNC: 109 U/L (ref 25–115)
ALT SERPL-CCNC: 41 U/L (ref 5–40)
ANION GAP SERPL CALCULATED.3IONS-SCNC: 11 MMOL/L (ref 3–15)
AST SERPL-CCNC: 27 U/L (ref 10–37)
BASOPHILS ABSOLUTE: 0.1 K/UL (ref 0–0.2)
BASOPHILS RELATIVE PERCENT: 1 % (ref 0–2)
BILIRUB SERPL-MCNC: 0.2 MG/DL (ref 0.2–1.2)
BILIRUBIN DIRECT: <0.2 MG/DL (ref 0–0.3)
BUN BLDV-MCNC: 9 MG/DL (ref 6–22)
CALCIUM SERPL-MCNC: 9.1 MG/DL (ref 8.4–10.5)
CHLORIDE BLD-SCNC: 107 MMOL/L (ref 98–110)
CO2: 25 MMOL/L (ref 20–32)
CREAT SERPL-MCNC: 0.6 MG/DL (ref 0.5–1.2)
EOSINOPHIL # BLD: 2 % (ref 0–6)
EOSINOPHILS ABSOLUTE: 0.1 K/UL (ref 0–0.5)
GFR, ESTIMATED: >60 ML/MIN/1.73 SQ.M.
GLOBULIN: 2.6 G/DL (ref 2–4)
GLUCOSE: 81 MG/DL (ref 70–99)
HAV AB SERPL IA-ACNC: NONREACTIVE
HBV SURFACE AB TITR SER: 39.3 IU/L
HCT VFR BLD CALC: 38.6 % (ref 35.1–48)
HEMOGLOBIN: 11.4 G/DL (ref 11.7–16)
HEPATITIS B CORE TOTAL ANTIBODY: NORMAL
HEPATITIS C ANTIBODY: NORMAL
INR BLD: 0.95 (ref 0.89–1.29)
LYMPHOCYTES # BLD: 36 % (ref 20–45)
LYMPHOCYTES ABSOLUTE: 1.6 K/UL (ref 1–4.8)
MCH RBC QN AUTO: 25 PG (ref 26–34)
MCHC RBC AUTO-ENTMCNC: 30 G/DL (ref 31–36)
MCV RBC AUTO: 86 FL (ref 80–99)
MONOCYTES ABSOLUTE: 0.4 K/UL (ref 0.1–1)
MONOCYTES: 9 % (ref 3–12)
NEUTROPHILS ABSOLUTE: 2.2 K/UL (ref 1.8–7.7)
NEUTROPHILS: 51 % (ref 40–75)
PDW BLD-RTO: 14.9 % (ref 10–15.5)
PLATELET # BLD: 242 K/UL (ref 140–440)
PMV BLD AUTO: 10.7 FL (ref 9–13)
POTASSIUM SERPL-SCNC: 4.2 MMOL/L (ref 3.5–5.5)
PROTHROMBIN TIME: 10.2 SEC (ref 9–13)
RBC # BLD: 4.49 M/UL (ref 3.8–5.2)
SODIUM BLD-SCNC: 143 MMOL/L (ref 133–145)
TOTAL PROTEIN: 6.7 G/DL (ref 6.4–8.3)
WBC # BLD: 4.4 K/UL (ref 4–11)

## 2024-09-03 ENCOUNTER — HOSPITAL ENCOUNTER (OUTPATIENT)
Facility: HOSPITAL | Age: 44
Discharge: HOME | End: 2024-09-06
Payer: MEDICAID

## 2024-09-03 DIAGNOSIS — K76.9 CHRONIC LIVER DISEASE: ICD-10-CM

## 2024-09-03 PROCEDURE — 6360000004 HC RX CONTRAST MEDICATION: Performed by: NURSE PRACTITIONER

## 2024-09-03 PROCEDURE — A9577 INJ MULTIHANCE: HCPCS | Performed by: NURSE PRACTITIONER

## 2024-09-03 PROCEDURE — 74183 MRI ABD W/O CNTR FLWD CNTR: CPT

## 2024-09-03 RX ADMIN — GADOBENATE DIMEGLUMINE 15 ML: 529 INJECTION, SOLUTION INTRAVENOUS at 09:13

## 2024-09-09 ENCOUNTER — TELEPHONE (OUTPATIENT)
Age: 44
End: 2024-09-09

## 2024-10-02 ENCOUNTER — HOSPITAL ENCOUNTER (OUTPATIENT)
Facility: HOSPITAL | Age: 44
Setting detail: SPECIMEN
Discharge: HOME | End: 2024-10-05

## 2024-10-02 ENCOUNTER — OFFICE VISIT (OUTPATIENT)
Age: 44
End: 2024-10-02
Payer: MEDICAID

## 2024-10-02 VITALS
WEIGHT: 180.8 LBS | HEIGHT: 65 IN | SYSTOLIC BLOOD PRESSURE: 140 MMHG | HEART RATE: 72 BPM | OXYGEN SATURATION: 99 % | TEMPERATURE: 98 F | DIASTOLIC BLOOD PRESSURE: 87 MMHG | BODY MASS INDEX: 30.12 KG/M2

## 2024-10-02 DIAGNOSIS — K76.9 CHRONIC LIVER DISEASE: ICD-10-CM

## 2024-10-02 DIAGNOSIS — K76.9 CHRONIC LIVER DISEASE: Primary | ICD-10-CM

## 2024-10-02 LAB — SENTARA SPECIMEN COLLECTION: NORMAL

## 2024-10-02 PROCEDURE — 3077F SYST BP >= 140 MM HG: CPT | Performed by: NURSE PRACTITIONER

## 2024-10-02 PROCEDURE — 91200 LIVER ELASTOGRAPHY: CPT | Performed by: NURSE PRACTITIONER

## 2024-10-02 PROCEDURE — 99214 OFFICE O/P EST MOD 30 MIN: CPT | Performed by: NURSE PRACTITIONER

## 2024-10-02 PROCEDURE — 3079F DIAST BP 80-89 MM HG: CPT | Performed by: NURSE PRACTITIONER

## 2024-10-02 PROCEDURE — 99001 SPECIMEN HANDLING PT-LAB: CPT

## 2024-10-02 RX ORDER — TRAZODONE HYDROCHLORIDE 100 MG/1
100 TABLET ORAL DAILY PRN
COMMUNITY
Start: 2024-08-20

## 2024-10-02 RX ORDER — DOXYCYCLINE 100 MG/1
100 CAPSULE ORAL 2 TIMES DAILY
COMMUNITY
Start: 2024-09-28 | End: 2024-10-08

## 2024-10-02 RX ORDER — BENZONATATE 200 MG/1
200 CAPSULE ORAL 3 TIMES DAILY PRN
COMMUNITY
Start: 2024-09-28 | End: 2024-10-05

## 2024-10-02 RX ORDER — PANTOPRAZOLE SODIUM 40 MG/1
40 TABLET, DELAYED RELEASE ORAL 2 TIMES DAILY
COMMUNITY
Start: 2024-09-02

## 2024-10-02 RX ORDER — PREDNISONE 20 MG/1
20 TABLET ORAL 2 TIMES DAILY
COMMUNITY
Start: 2024-09-28 | End: 2024-10-02

## 2024-10-02 RX ORDER — QUETIAPINE FUMARATE 50 MG/1
50 TABLET, FILM COATED ORAL NIGHTLY
COMMUNITY
Start: 2024-09-24 | End: 2024-12-23

## 2024-10-02 RX ORDER — MINOXIDIL 2.5 MG/1
1.25 TABLET ORAL DAILY
COMMUNITY
Start: 2024-09-06

## 2024-10-02 RX ORDER — DUTASTERIDE 0.5 MG/1
CAPSULE, LIQUID FILLED ORAL
COMMUNITY
Start: 2024-09-06

## 2024-10-02 RX ORDER — DEXTROMETHORPHAN HYDROBROMIDE AND PROMETHAZINE HYDROCHLORIDE 15; 6.25 MG/5ML; MG/5ML
5 SYRUP ORAL NIGHTLY PRN
COMMUNITY
Start: 2024-09-28

## 2024-10-02 RX ORDER — FLUCONAZOLE 200 MG/1
TABLET ORAL
COMMUNITY
Start: 2024-09-19

## 2024-10-02 NOTE — PROGRESS NOTES
[facility]      Soham Mejia MD, FACP, FACG, FAASLD      ADELA Luevano-JUNI Campos, [facility]   Yoanna Marysaloaugusto, [facility]   Lucero Adal, [facility]  Ranjan Garvey, [facility]   Siobhan Jack, [facility]   Judy Martinezon, [facility]      [facility]   [address], [address]   [place]  [address] [phone]   FAX: [phone]  [facility]   [address], [address]   [place]  [address] [phone]   FAX: [phone]       Patient Care Team:  Gavi Palacios DO as PCP - General  Yuriy Mcgee MD (Bariatrics)  Alexi Navarrete MD (Oncology)      Patient Active Problem List   Diagnosis    Hypertension    Chronic back pain    Arthritis of both knees    GERD (gastroesophageal reflux disease)    Anemia    Heavy menses    Marijuana use    Smoking history    Anxiety    Sleep disorder    Diaphragmatic hernia without obstruction and without gangrene    Intestinal malabsorption    S/P laparoscopic sleeve gastrectomy    Dysphagia    Epigastric pain    IBS (irritable bowel syndrome)    Hypercholesterolemia    Migraine         Stephanie Robertson returns to the [facility] today for education and management of suspected SLD.  Fibroscan assessment of hepatic fibrosis was allso performed during today's appointment.      The active problem list, all pertinent past medical history, medications, liver histology, endoscopic studies, radiologic findings and laboratory findings related to the liver disorder were reviewed with the patient.     The patient is a 44 y.o. [facility] female who was found to have steatotic liver disease (SLD) on liver biopsy performed during obesity surgery.      The patient underwent a sleeve gastrectomy in 10/2023.  Baseline body weight at the time of weight loss surgery was was 287 pounds.

## 2024-10-03 LAB
A/G RATIO: 1.5 RATIO (ref 1.1–2.6)
ALBUMIN: 4 G/DL (ref 3.5–5)
ALP BLD-CCNC: 98 U/L (ref 25–115)
ALT SERPL-CCNC: 54 U/L (ref 5–40)
ANION GAP SERPL CALCULATED.3IONS-SCNC: 12 MMOL/L (ref 3–15)
AST SERPL-CCNC: 35 U/L (ref 10–37)
BASOPHILS ABSOLUTE: 0.1 K/UL (ref 0–0.2)
BASOPHILS RELATIVE PERCENT: 1 % (ref 0–2)
BILIRUB SERPL-MCNC: 0.2 MG/DL (ref 0.2–1.2)
BILIRUBIN DIRECT: <0.2 MG/DL (ref 0–0.3)
BUN BLDV-MCNC: 12 MG/DL (ref 6–22)
CALCIUM SERPL-MCNC: 9.2 MG/DL (ref 8.4–10.5)
CHLORIDE BLD-SCNC: 106 MMOL/L (ref 98–110)
CO2: 26 MMOL/L (ref 20–32)
CREAT SERPL-MCNC: 0.5 MG/DL (ref 0.5–1.2)
EOSINOPHIL # BLD: 1 % (ref 0–6)
EOSINOPHILS ABSOLUTE: 0.1 K/UL (ref 0–0.5)
GFR, ESTIMATED: >60 ML/MIN/1.73 SQ.M.
GLOBULIN: 2.7 G/DL (ref 2–4)
GLUCOSE: 74 MG/DL (ref 70–99)
HCT VFR BLD CALC: 39.1 % (ref 35.1–48)
HEMOGLOBIN: 11.3 G/DL (ref 11.7–16)
LYMPHOCYTES # BLD: 35 % (ref 20–45)
LYMPHOCYTES ABSOLUTE: 3.2 K/UL (ref 1–4.8)
MCH RBC QN AUTO: 25 PG (ref 26–34)
MCHC RBC AUTO-ENTMCNC: 29 G/DL (ref 31–36)
MCV RBC AUTO: 87 FL (ref 80–99)
MONOCYTES ABSOLUTE: 0.8 K/UL (ref 0.1–1)
MONOCYTES: 9 % (ref 3–12)
NEUTROPHILS ABSOLUTE: 5.1 K/UL (ref 1.8–7.7)
NEUTROPHILS: 55 % (ref 40–75)
PDW BLD-RTO: 15.4 % (ref 10–15.5)
PLATELET # BLD: 238 K/UL (ref 140–440)
PMV BLD AUTO: 10.8 FL (ref 9–13)
POTASSIUM SERPL-SCNC: 3.4 MMOL/L (ref 3.5–5.5)
RBC # BLD: 4.48 M/UL (ref 3.8–5.2)
SODIUM BLD-SCNC: 144 MMOL/L (ref 133–145)
TOTAL PROTEIN: 6.7 G/DL (ref 6.4–8.3)
WBC # BLD: 9.2 K/UL (ref 4–11)

## 2024-10-25 ENCOUNTER — HOSPITAL ENCOUNTER (OUTPATIENT)
Facility: HOSPITAL | Age: 44
Setting detail: SPECIMEN
Discharge: HOME | End: 2024-10-28

## 2024-10-25 ENCOUNTER — OFFICE VISIT (OUTPATIENT)
Age: 44
End: 2024-10-25
Payer: MEDICAID

## 2024-10-25 VITALS
HEIGHT: 65 IN | SYSTOLIC BLOOD PRESSURE: 124 MMHG | BODY MASS INDEX: 29.92 KG/M2 | WEIGHT: 179.6 LBS | TEMPERATURE: 97.4 F | HEART RATE: 68 BPM | DIASTOLIC BLOOD PRESSURE: 71 MMHG | OXYGEN SATURATION: 100 %

## 2024-10-25 DIAGNOSIS — R10.13 EPIGASTRIC PAIN: ICD-10-CM

## 2024-10-25 DIAGNOSIS — K21.9 GASTROESOPHAGEAL REFLUX DISEASE, UNSPECIFIED WHETHER ESOPHAGITIS PRESENT: ICD-10-CM

## 2024-10-25 DIAGNOSIS — K42.9 UMBILICAL HERNIA WITHOUT OBSTRUCTION AND WITHOUT GANGRENE: ICD-10-CM

## 2024-10-25 DIAGNOSIS — D64.9 ANEMIA, UNSPECIFIED TYPE: ICD-10-CM

## 2024-10-25 DIAGNOSIS — K90.9 INTESTINAL MALABSORPTION, UNSPECIFIED TYPE: Primary | ICD-10-CM

## 2024-10-25 DIAGNOSIS — K90.9 INTESTINAL MALABSORPTION, UNSPECIFIED TYPE: ICD-10-CM

## 2024-10-25 DIAGNOSIS — Z98.84 S/P LAPAROSCOPIC SLEEVE GASTRECTOMY: ICD-10-CM

## 2024-10-25 LAB — SENTARA SPECIMEN COLLECTION: NORMAL

## 2024-10-25 PROCEDURE — 99214 OFFICE O/P EST MOD 30 MIN: CPT | Performed by: NURSE PRACTITIONER

## 2024-10-25 PROCEDURE — 3078F DIAST BP <80 MM HG: CPT | Performed by: NURSE PRACTITIONER

## 2024-10-25 PROCEDURE — 99001 SPECIMEN HANDLING PT-LAB: CPT

## 2024-10-25 PROCEDURE — 3074F SYST BP LT 130 MM HG: CPT | Performed by: NURSE PRACTITIONER

## 2024-10-25 RX ORDER — M-VIT,TX,IRON,MINS/CALC/FOLIC 27MG-0.4MG
1 TABLET ORAL DAILY
COMMUNITY

## 2024-10-25 RX ORDER — CLOBETASOL PROPIONATE 0.5 MG/ML
SOLUTION TOPICAL
COMMUNITY
Start: 2024-10-04

## 2024-10-25 RX ORDER — CLOPIDOGREL BISULFATE 75 MG/1
75 TABLET ORAL DAILY
COMMUNITY
Start: 2024-10-07 | End: 2025-10-07

## 2024-10-25 RX ORDER — HYDROXYZINE HYDROCHLORIDE 25 MG/1
25 TABLET, FILM COATED ORAL 3 TIMES DAILY PRN
COMMUNITY
Start: 2024-10-04

## 2024-10-25 RX ORDER — METOCLOPRAMIDE 10 MG/1
10 TABLET ORAL 4 TIMES DAILY
Qty: 90 TABLET | Refills: 5 | Status: SHIPPED | OUTPATIENT
Start: 2024-10-25 | End: 2025-04-23

## 2024-10-25 RX ORDER — PANTOPRAZOLE SODIUM 40 MG/1
40 TABLET, DELAYED RELEASE ORAL 2 TIMES DAILY
Qty: 60 TABLET | Refills: 11 | Status: SHIPPED | OUTPATIENT
Start: 2024-10-25

## 2024-10-25 NOTE — PROGRESS NOTES
DAY AS NEEDED      vitamin D (CHOLECALCIFEROL) 50 MCG (2000 UT) TABS tablet       cyclobenzaprine (FLEXERIL) 10 MG tablet Cyclobenzaprine Oral        active      dicyclomine (BENTYL) 10 MG capsule TAKE 2 CAPSULES (20 MG TOTAL) BY MOUTH 4 (FOUR) TIMES A DAY FOR 15 DOSES      nitroGLYCERIN (NITROSTAT) 0.3 MG SL tablet Place 1 tablet under the tongue every 5 minutes as needed      polyethylene glycol (GLYCOLAX) 17 GM/SCOOP powder Take as a split dose as directed.      Probiotic Product (PROBIOTIC-10 PO) Take by mouth      ondansetron (ZOFRAN-ODT) 4 MG disintegrating tablet Take 1 tablet by mouth every 8 hours as needed for Nausea 30 tablet 1    butalbital-acetaminophen-caffeine (FIORICET, ESGIC) -40 MG per tablet Take 1 tablet by mouth every 4 hours as needed      famotidine (PEPCID) 20 MG tablet Take 1 tablet by mouth 2 times daily      hydrocortisone 2.5 % cream       DULERA 200-5 MCG/ACT inhaler Inhale 1 puff into the lungs as needed Indications: asthma      montelukast (SINGULAIR) 10 MG tablet Take 1 tablet by mouth nightly Indications: asthma      vitamin B-12 (CYANOCOBALAMIN) 500 MCG tablet Take 1 tablet by mouth daily      Prenatal Multivit-Min-Fe-FA (PRENATAL/IRON PO) Take by mouth      cetirizine (ZYRTEC) 10 MG tablet Take 1 tablet by mouth daily Indications: Allergies      topiramate (TOPAMAX) 50 MG tablet Take 1 tablet by mouth 2 times daily Indications: migranes      venlafaxine (EFFEXOR XR) 75 MG extended release capsule Take 1 capsule by mouth every morning Indications: bipolar      QUEtiapine (SEROQUEL) 50 MG tablet Take 1 tablet by mouth nightly (Patient not taking: Reported on 10/25/2024)       No current facility-administered medications for this visit.     Ibuprofen, Naproxen, Cephalexin, and Nsaids    Review of Symptoms:       General - No history or complaints of unexpected fever or chills  Head/Neck - No history or complaints of headache or dizziness  Cardiac - No history or complaints of

## 2024-10-26 LAB — FOLATE: 19.2 NG/ML

## 2024-10-30 LAB — THIAMINE BLOOD: 95 NMOL/L (ref 78–185)

## 2024-11-05 ENCOUNTER — PREP FOR PROCEDURE (OUTPATIENT)
Age: 44
End: 2024-11-05

## 2024-11-05 DIAGNOSIS — Z98.84 STATUS POST BARIATRIC SURGERY: ICD-10-CM

## 2024-11-05 PROBLEM — R13.10 DIFFICULTY IN SWALLOWING: Status: ACTIVE | Noted: 2024-11-05

## 2024-11-05 PROBLEM — K21.9 GASTROESOPHAGEAL REFLUX DISEASE: Status: ACTIVE | Noted: 2024-11-05

## 2024-11-05 PROBLEM — R11.2 NAUSEA WITH VOMITING: Status: ACTIVE | Noted: 2024-11-05

## 2024-11-11 ENCOUNTER — TELEMEDICINE (OUTPATIENT)
Age: 44
End: 2024-11-11
Payer: MEDICAID

## 2024-11-11 DIAGNOSIS — K90.9 INTESTINAL MALABSORPTION, UNSPECIFIED TYPE: ICD-10-CM

## 2024-11-11 DIAGNOSIS — Z98.84 S/P LAPAROSCOPIC SLEEVE GASTRECTOMY: ICD-10-CM

## 2024-11-11 DIAGNOSIS — R10.13 EPIGASTRIC PAIN: Primary | ICD-10-CM

## 2024-11-11 DIAGNOSIS — Z72.0 NICOTINE USE: ICD-10-CM

## 2024-11-11 DIAGNOSIS — R13.10 DYSPHAGIA, UNSPECIFIED TYPE: ICD-10-CM

## 2024-11-11 PROCEDURE — 99214 OFFICE O/P EST MOD 30 MIN: CPT | Performed by: SPECIALIST

## 2024-11-11 NOTE — PROGRESS NOTES
EGD - History and Physical  Telemedicine Encounter    Subjective:     The patient is a 44 y.o. obese female who is 13 months post sleeve resection who was slated for an EGD in May for epigastric tenderness on exam that was suggestive of gastric ulcer after NSAID use along with continued smoking.  She never had that EGD due to a positive Cologuard and planned colonoscopy that she states took presidence over the upper GI pain.  Of note she states today that her GI provider never followed-up on her positive Cologuard.  She had a recent encounter with ROSAURA Mccollum where she reported dysphagia with eating, epigastric pain, nausea, vomiting and reflux. She is having pain at umbilicus and is more painful if she is constipated.  She denies difficulty swallowing.     Patient Active Problem List    Diagnosis Date Noted    Gastroesophageal reflux disease 11/05/2024    Nausea with vomiting 11/05/2024    Difficulty in swallowing 11/05/2024    Status post bariatric surgery 11/05/2024    Umbilical hernia 10/25/2024    IBS (irritable bowel syndrome) 08/19/2024    Hypercholesterolemia 08/19/2024    Migraine 08/19/2024    Epigastric pain 04/26/2024    Intestinal malabsorption 01/31/2024    S/P laparoscopic sleeve gastrectomy 01/31/2024    Dysphagia 01/31/2024    Diaphragmatic hernia without obstruction and without gangrene 10/26/2023    Marijuana use 06/21/2023    Smoking history 06/21/2023    Anxiety 06/21/2023    Sleep disorder 06/21/2023    Anemia 05/19/2023    Heavy menses 05/19/2023    Hypertension 12/19/2022    Chronic back pain 12/19/2022    Arthritis of both knees 12/19/2022    GERD (gastroesophageal reflux disease) 12/19/2022      Past Surgical History:   Procedure Laterality Date    HERNIA REPAIR  1993    ventral hernia repair without mesh    SLEEVE GASTRECTOMY N/A 10/26/2023    1.LAPAROSCOPIC SLEEVE GASTRECTOMY WITH OVERSEWN STAPE LINE 2. LIVER WEDGE BIOPSY 3.INTRAOPERATIVE ENDOSCOPY WITH BIOPSY 4. BIOPSY OF LIVER LESIONS

## 2024-11-13 ENCOUNTER — ANESTHESIA EVENT (OUTPATIENT)
Facility: HOSPITAL | Age: 44
End: 2024-11-13
Payer: MEDICAID

## 2024-11-19 ENCOUNTER — HOSPITAL ENCOUNTER (OUTPATIENT)
Facility: HOSPITAL | Age: 44
Setting detail: OUTPATIENT SURGERY
Discharge: HOME | End: 2024-11-19
Attending: SPECIALIST | Admitting: SPECIALIST
Payer: MEDICAID

## 2024-11-19 ENCOUNTER — ANESTHESIA (OUTPATIENT)
Facility: HOSPITAL | Age: 44
End: 2024-11-19
Payer: MEDICAID

## 2024-11-19 VITALS
RESPIRATION RATE: 27 BRPM | OXYGEN SATURATION: 100 % | SYSTOLIC BLOOD PRESSURE: 123 MMHG | BODY MASS INDEX: 29.67 KG/M2 | HEART RATE: 80 BPM | HEIGHT: 65 IN | DIASTOLIC BLOOD PRESSURE: 73 MMHG | TEMPERATURE: 97.8 F | WEIGHT: 178.1 LBS

## 2024-11-19 LAB — HCG UR QL: NEGATIVE

## 2024-11-19 PROCEDURE — 81025 URINE PREGNANCY TEST: CPT

## 2024-11-19 PROCEDURE — 7100000010 HC PHASE II RECOVERY - FIRST 15 MIN: Performed by: SPECIALIST

## 2024-11-19 PROCEDURE — 7100000011 HC PHASE II RECOVERY - ADDTL 15 MIN: Performed by: SPECIALIST

## 2024-11-19 PROCEDURE — 3600000002 HC SURGERY LEVEL 2 BASE: Performed by: SPECIALIST

## 2024-11-19 PROCEDURE — 2580000003 HC RX 258: Performed by: ANESTHESIOLOGY

## 2024-11-19 PROCEDURE — 3700000000 HC ANESTHESIA ATTENDED CARE: Performed by: SPECIALIST

## 2024-11-19 PROCEDURE — 6360000002 HC RX W HCPCS: Performed by: ANESTHESIOLOGY

## 2024-11-19 PROCEDURE — 43235 EGD DIAGNOSTIC BRUSH WASH: CPT | Performed by: SPECIALIST

## 2024-11-19 PROCEDURE — 2709999900 HC NON-CHARGEABLE SUPPLY: Performed by: SPECIALIST

## 2024-11-19 PROCEDURE — 6360000002 HC RX W HCPCS: Performed by: NURSE ANESTHETIST, CERTIFIED REGISTERED

## 2024-11-19 PROCEDURE — 2500000003 HC RX 250 WO HCPCS: Performed by: NURSE ANESTHETIST, CERTIFIED REGISTERED

## 2024-11-19 RX ORDER — LABETALOL HYDROCHLORIDE 5 MG/ML
10 INJECTION, SOLUTION INTRAVENOUS
Status: DISCONTINUED | OUTPATIENT
Start: 2024-11-19 | End: 2024-11-19 | Stop reason: HOSPADM

## 2024-11-19 RX ORDER — NALOXONE HYDROCHLORIDE 0.4 MG/ML
INJECTION, SOLUTION INTRAMUSCULAR; INTRAVENOUS; SUBCUTANEOUS PRN
Status: DISCONTINUED | OUTPATIENT
Start: 2024-11-19 | End: 2024-11-19 | Stop reason: HOSPADM

## 2024-11-19 RX ORDER — SODIUM CHLORIDE 9 MG/ML
INJECTION, SOLUTION INTRAVENOUS PRN
Status: DISCONTINUED | OUTPATIENT
Start: 2024-11-19 | End: 2024-11-19 | Stop reason: HOSPADM

## 2024-11-19 RX ORDER — SODIUM CHLORIDE 0.9 % (FLUSH) 0.9 %
5-40 SYRINGE (ML) INJECTION EVERY 12 HOURS SCHEDULED
Status: DISCONTINUED | OUTPATIENT
Start: 2024-11-19 | End: 2024-11-19 | Stop reason: HOSPADM

## 2024-11-19 RX ORDER — IPRATROPIUM BROMIDE AND ALBUTEROL SULFATE 2.5; .5 MG/3ML; MG/3ML
1 SOLUTION RESPIRATORY (INHALATION)
Status: DISCONTINUED | OUTPATIENT
Start: 2024-11-19 | End: 2024-11-19 | Stop reason: HOSPADM

## 2024-11-19 RX ORDER — SODIUM CHLORIDE 9 MG/ML
INJECTION, SOLUTION INTRAVENOUS CONTINUOUS
Status: DISCONTINUED | OUTPATIENT
Start: 2024-11-19 | End: 2024-11-19 | Stop reason: HOSPADM

## 2024-11-19 RX ORDER — SODIUM CHLORIDE 0.9 % (FLUSH) 0.9 %
5-40 SYRINGE (ML) INJECTION PRN
Status: DISCONTINUED | OUTPATIENT
Start: 2024-11-19 | End: 2024-11-19 | Stop reason: HOSPADM

## 2024-11-19 RX ORDER — PROCHLORPERAZINE EDISYLATE 5 MG/ML
5 INJECTION INTRAMUSCULAR; INTRAVENOUS
Status: DISCONTINUED | OUTPATIENT
Start: 2024-11-19 | End: 2024-11-19 | Stop reason: HOSPADM

## 2024-11-19 RX ORDER — PROPOFOL 10 MG/ML
INJECTION, EMULSION INTRAVENOUS
Status: DISCONTINUED | OUTPATIENT
Start: 2024-11-19 | End: 2024-11-19 | Stop reason: SDUPTHER

## 2024-11-19 RX ORDER — ONDANSETRON 2 MG/ML
4 INJECTION INTRAMUSCULAR; INTRAVENOUS
Status: DISCONTINUED | OUTPATIENT
Start: 2024-11-19 | End: 2024-11-19 | Stop reason: HOSPADM

## 2024-11-19 RX ORDER — LIDOCAINE HYDROCHLORIDE 20 MG/ML
INJECTION, SOLUTION EPIDURAL; INFILTRATION; INTRACAUDAL; PERINEURAL
Status: DISCONTINUED | OUTPATIENT
Start: 2024-11-19 | End: 2024-11-19 | Stop reason: SDUPTHER

## 2024-11-19 RX ORDER — DIPHENHYDRAMINE HYDROCHLORIDE 50 MG/ML
12.5 INJECTION INTRAMUSCULAR; INTRAVENOUS
Status: DISCONTINUED | OUTPATIENT
Start: 2024-11-19 | End: 2024-11-19 | Stop reason: HOSPADM

## 2024-11-19 RX ORDER — ONDANSETRON 2 MG/ML
4 INJECTION INTRAMUSCULAR; INTRAVENOUS ONCE
Status: COMPLETED | OUTPATIENT
Start: 2024-11-19 | End: 2024-11-19

## 2024-11-19 RX ADMIN — SODIUM CHLORIDE: 9 INJECTION, SOLUTION INTRAVENOUS at 14:47

## 2024-11-19 RX ADMIN — PROPOFOL 50 MG: 10 INJECTION, EMULSION INTRAVENOUS at 17:06

## 2024-11-19 RX ADMIN — PROPOFOL 50 MG: 10 INJECTION, EMULSION INTRAVENOUS at 17:04

## 2024-11-19 RX ADMIN — ONDANSETRON 4 MG: 2 INJECTION INTRAMUSCULAR; INTRAVENOUS at 14:51

## 2024-11-19 RX ADMIN — PROPOFOL 50 MG: 10 INJECTION, EMULSION INTRAVENOUS at 17:05

## 2024-11-19 RX ADMIN — LIDOCAINE HYDROCHLORIDE 100 MG: 20 INJECTION, SOLUTION EPIDURAL; INFILTRATION; INTRACAUDAL; PERINEURAL at 17:04

## 2024-11-19 ASSESSMENT — LIFESTYLE VARIABLES: SMOKING_STATUS: 1

## 2024-11-19 ASSESSMENT — PAIN DESCRIPTION - LOCATION: LOCATION: BACK

## 2024-11-19 ASSESSMENT — PAIN SCALES - GENERAL: PAINLEVEL_OUTOF10: 8

## 2024-11-19 ASSESSMENT — COPD QUESTIONNAIRES: CAT_SEVERITY: MODERATE

## 2024-11-19 ASSESSMENT — PAIN - FUNCTIONAL ASSESSMENT: PAIN_FUNCTIONAL_ASSESSMENT: 0-10

## 2024-11-19 NOTE — INTERVAL H&P NOTE
Update History & Physical    The patient's History and Physical of November 11, 2024 was reviewed with the patient and I examined the patient. There was no change. The surgical site was confirmed by the patient and me.     Plan: The risks, benefits, expected outcome, and alternative to the recommended procedure have been discussed with the patient. Patient understands and wants to proceed with the procedure.     Electronically signed by Yuriy Mcgee MD on 11/19/2024 at 4:44 PM

## 2024-11-19 NOTE — PERIOP NOTE
Pt informed that the procedure will take place in approximately about  2 hours. Pt is laying calmly on the stretcher. Call bell within reach.

## 2024-11-19 NOTE — PERIOP NOTE
Discharge instructions reviewed with patient and caregiver. All questions and concerns addressed. Tolerating p.o. ginger ale and saltines.

## 2024-11-19 NOTE — ANESTHESIA PRE PROCEDURE
GERD: no interval change          Endo/Other: Negative Endo/Other ROS                    Abdominal:             Vascular: negative vascular ROS.         Other Findings:       Anesthesia Plan      MAC     ASA 3       Induction: intravenous.      Anesthetic plan and risks discussed with patient.      Plan discussed with CRNA.    Attending anesthesiologist reviewed and agrees with Preprocedure content            Milton Singh DO   11/19/2024

## 2024-11-19 NOTE — PERIOP NOTE
Pt. Complaining of nausea, dizziness and overall feeling \"unwell\" when attempting to ambulate back from the bathroom. Samantha SANTANA at bedside and ordered pt. To have fluid bolus and nausea medicine. Pt. Is now currently in bed with fluids administering, zofran given according to MAR. Pt VSS with call bell within reach And pt. Wishes to have sister Skye Aguilar called and updated. Will continue to monitor.    1501: Sister called according to Pt request.     1532: Pt. Updated on procedure roll time.

## 2024-11-19 NOTE — PERIOP NOTE
Reviewed PTA medication list with patient/caregiver and patient/caregiver denies any additional medications.     Patient admits to having a responsible adult care for them at home for at least 24 hours after surgery.    Patient encouraged to use gown warming system and informed that using said warming gown to regulate body temperature prior to a procedure has been shown to help reduce the risks of blood clots and infection.    Patient's pharmacy of choice verified and documented in PTA medication section.    Dual skin assessment & fall risk band verification completed with MARY Castillo RN.

## 2024-11-19 NOTE — DISCHARGE INSTRUCTIONS
with your doctor before you increase the amount of fluids you drink.  When you are able to eat, try clear soups, mild foods, and liquids until all symptoms are gone for 12 to 48 hours. Other good choices include dry toast, crackers, cooked cereal, and gelatin dessert, such as Jell-O.  Do not smoke. Smoking and being around smoke can make nausea worse.   When should you call for help?    Call your doctor now or seek immediate medical care if:    You have new or worse nausea or vomiting.     You are too sick to your stomach to drink any fluids.     You cannot keep down fluids.     You have symptoms of dehydration, such as:  Dry eyes and a dry mouth.  Passing only a little urine.  Feeling thirstier than usual.     You are dizzy or lightheaded, or you feel like you may faint.           Infection After Surgery: Care Instructions  Overview  After surgery, an infection is always possible. It doesn't mean that the surgery didn't go well.  How can you care for yourself at home?  Make sure your surgeon knows about the infection, especially if you saw another doctor about your symptoms.  If your doctor prescribed antibiotics, take them as directed. Do not stop taking them just because you feel better. You need to take the full course of antibiotics.  Keep the skin clean and dry.  You may have a bandage over the cut (incision). A bandage helps the incision heal and protects it. Your doctor will tell you how to take care of this. Keep it clean and dry. You may have drainage from the wound.     Call your doctor now or seek immediate medical care if:    You have signs of an infection such as:  Increased pain, swelling, warmth, or redness in the area.  Red streaks leading from the area.  Pus draining from the wound.  A new or higher fever.       Bleeding After Surgery: Care Instructions  Overview  After surgery, it is common to have some minor bruising or bleeding from the cut (incision) made by your doctor. But problems may occur

## 2024-11-19 NOTE — ANESTHESIA POSTPROCEDURE EVALUATION
Department of Anesthesiology  Postprocedure Note    Patient: Stephanie Robertson  MRN: [id number]  YOB: 1980  Date of evaluation: 11/19/2024    Procedure Summary       Date: 11/19/24 Room / Location: [facility] 01 / [facility] MAIN OR    Anesthesia Start: 1701 Anesthesia Stop: 1711    Procedure: ESOPHAGOGASTRODUODENOSCOPY (EGD) WITH POSSIBLE BIOPSY AND POSSIBLE LIVER WEDGE BIOPSY (Upper GI Region) Diagnosis:       Gastroesophageal reflux disease      Nausea with vomiting      Difficulty in swallowing      Status post bariatric surgery      (Gastroesophageal reflux disease [K21.9])      (Nausea with vomiting [R11.2])      (Difficulty in swallowing [R13.10])      (Status post bariatric surgery [Z98.84])    Surgeons: Yuriy Mcgee MD Responsible Provider: Milton Singh DO    Anesthesia Type: MAC ASA Status: 3            Anesthesia Type: MAC    Poly Phase I:      Poly Phase II:      Anesthesia Post Evaluation    Patient location during evaluation: PACU  Patient participation: complete - patient participated  Level of consciousness: awake and alert  Airway patency: patent  Nausea & Vomiting: no nausea and no vomiting  Cardiovascular status: blood pressure returned to baseline  Respiratory status: acceptable  Hydration status: euvolemic  Pain management: adequate    There were no known notable events for this encounter.

## 2024-11-19 NOTE — PATIENT INSTRUCTIONS
increase your chances of quitting for good.  Limit alcohol to 2 drinks a day for men and 1 drink a day for women. Too much alcohol can cause health problems.  If you have a BMI higher than 25  Your doctor may do other tests to check your risk for weight-related health problems. This may include measuring the distance around your waist. A waist measurement of more than 40 inches in men or 35 inches in women can increase the risk of weight-related health problems.  Talk with your doctor about steps you can take to stay healthy or improve your health. You may need to make lifestyle changes to lose weight and stay healthy, such as changing your diet and getting regular exercise.  If you have a BMI lower than 18.5  Your doctor may do other tests to check your risk for health problems.  Talk with your doctor about steps you can take to stay healthy or improve your health. You may need to make lifestyle changes to gain or maintain weight and stay healthy, such as getting more healthy foods in your diet and doing exercises to build muscle.  Where can you learn more?  Go to http://www.GlampingHub.com.net/"[employer]pConnections.  Enter S176 in the search box to learn more about \"Body Mass Index: Care Instructions.\"  Current as of: June 26, 2018  Content Version: 11.8  © 1394-7617 [employer]. Care instructions adapted under license by [employer] (which disclaims liability or warranty for this information). If you have questions about a medical condition or this instruction, always ask your healthcare professional. [employer] disclaims any warranty or liability for your use of this information.

## 2024-11-19 NOTE — PROCEDURES
Esophagogastroduodenoscopy Procedure Note    Indications: Dysphagia    Anesthesia/Sedation: MAC anesthesia Propofol    Pre-Procedure Exam:  Airway: clear   Heart: normal S1and S2    Lungs: clear bilateral  Abdomen: soft, nontender, bowel sounds present and normal in all quadrants   Mental Status: awake, alert, and oriented to person, place, and time      Procedure in Detail:  Informed consent was obtained for the procedure, including conscious sedation. Risks of pancreatitis, infection, perforation, hemorrhage, adverse drug reaction, and aspiration were discussed. The patient was placed in the left lateral decubitus position.  Based on the pre-procedure assessment, including review of the patient's medical history, medications, allergies, and review of systems, he had been deemed to be an appropriate candidate for moderate sedation; he was therefore sedated with the medications listed above.  He was monitored continuously with electrocardiogram tracing, pulse oximetry, blood pressure monitoring, and direct observation.      The     gastroscope was inserted into the mouth and advanced under direct vision to second portion of the duodenum.  A careful inspection was made as the gastroscope was withdrawn, including a retroflexed view of the proximal stomach; findings and interventions are described below. Appropriate photodocumentation was obtained.    Findings:   Oropharynx:  Normal  Esophagus:  Normal  Cardia of the stomach:  gastritis  Body of the stomach:  Normal  Fundus of the stomach:  Normal  Antrum of the stomach:  gastritis  Duodenum:  Normal    Therapies:    none    Specimens: No specimens were collected.           Complications:   None; patient tolerated the procedure well.    EBL:  None           Attending Attestation:  I performed the procedure.     Recommendations:  - Continue acid suppression.  - continue smoking and NSAID avoidance    Signed by: Yuriy Mcgee MD

## 2024-11-26 RX ORDER — METOCLOPRAMIDE 10 MG/1
10 TABLET ORAL 4 TIMES DAILY
Qty: 90 TABLET | Refills: 2 | Status: SHIPPED | OUTPATIENT
Start: 2024-11-26

## 2024-12-09 ENCOUNTER — CLINICAL DOCUMENTATION (OUTPATIENT)
Age: 44
End: 2024-12-09

## 2024-12-09 NOTE — PROGRESS NOTES
[facility]     Soham Mejia MD, [facility], MACG, FAASLD   MD Cesia Barksdale, RAVINDER Campos, [facility]   Yoanna Thomson, [facility]   Lucero Tanosta, [facility]-  Ranjan Garvey, [facility]   Siobhan Jack, [facility]   Judy Castellon, [facility]   [address] [place] [address], [address]   [place]  [address] [phone]   FAX: [phone]  [facility]   [address], [address]   [place]  [address] [phone]   FAX: [phone]       Patient Care Team:  Gavi Palacios DO as PCP - General  Yuriy Mcgee MD (Bariatrics)  Alexi Navarrete MD (Oncology)  Bernard Goncalves MD (General Surgery)      Risk of elective surgery in a patient with chronic liver disease  The patient has been told that surgery should be considered for ventral hernia repair.    The patient does not have cirrhosis.  There is no increase in surgical risk compared to a patient without cirrhosis.    The risk of hepatic decompensation and mortality following surgery is Low.    The ultimate decision regarding whether or not to proceed with surgery will depend upon conversations between the surgeon and patient/and/or family and careful assessment of the risk and benefit of the surgical procedure.      Soham Mejia MD  [facility]  [address] [place] [address], [address]  [place]  [address] [phone]  [facility]

## 2025-01-09 ENCOUNTER — HOSPITAL ENCOUNTER (OUTPATIENT)
Facility: HOSPITAL | Age: 45
Setting detail: SPECIMEN
Discharge: HOME | End: 2025-01-12

## 2025-01-09 ENCOUNTER — HOSPITAL ENCOUNTER (OUTPATIENT)
Facility: HOSPITAL | Age: 45
Discharge: HOME | End: 2025-01-11
Payer: MEDICAID

## 2025-01-09 DIAGNOSIS — K43.9 VENTRAL HERNIA WITHOUT OBSTRUCTION OR GANGRENE: ICD-10-CM

## 2025-01-09 LAB
EKG ATRIAL RATE: 63 BPM
EKG DIAGNOSIS: NORMAL
EKG P AXIS: 5 DEGREES
EKG P-R INTERVAL: 146 MS
EKG Q-T INTERVAL: 362 MS
EKG QRS DURATION: 84 MS
EKG QTC CALCULATION (BAZETT): 370 MS
EKG R AXIS: 76 DEGREES
EKG T AXIS: 12 DEGREES
EKG VENTRICULAR RATE: 63 BPM
LABCORP SPECIMEN COLLECTION: NORMAL

## 2025-01-09 PROCEDURE — 93005 ELECTROCARDIOGRAM TRACING: CPT

## 2025-04-02 ENCOUNTER — OFFICE VISIT (OUTPATIENT)
Age: 45
End: 2025-04-02
Payer: MEDICAID

## 2025-04-02 ENCOUNTER — HOSPITAL ENCOUNTER (OUTPATIENT)
Facility: HOSPITAL | Age: 45
Setting detail: SPECIMEN
Discharge: HOME | End: 2025-04-05

## 2025-04-02 VITALS
DIASTOLIC BLOOD PRESSURE: 55 MMHG | WEIGHT: 177 LBS | HEIGHT: 65 IN | BODY MASS INDEX: 29.49 KG/M2 | TEMPERATURE: 98.1 F | HEART RATE: 65 BPM | SYSTOLIC BLOOD PRESSURE: 104 MMHG | OXYGEN SATURATION: 99 %

## 2025-04-02 DIAGNOSIS — K76.9 CHRONIC LIVER DISEASE: ICD-10-CM

## 2025-04-02 DIAGNOSIS — K76.9 CHRONIC LIVER DISEASE: Primary | ICD-10-CM

## 2025-04-02 LAB — SENTARA SPECIMEN COLLECTION: NORMAL

## 2025-04-02 PROCEDURE — 3078F DIAST BP <80 MM HG: CPT | Performed by: NURSE PRACTITIONER

## 2025-04-02 PROCEDURE — 99001 SPECIMEN HANDLING PT-LAB: CPT

## 2025-04-02 PROCEDURE — 99214 OFFICE O/P EST MOD 30 MIN: CPT | Performed by: NURSE PRACTITIONER

## 2025-04-02 PROCEDURE — 3074F SYST BP LT 130 MM HG: CPT | Performed by: NURSE PRACTITIONER

## 2025-04-02 RX ORDER — OXYCODONE AND ACETAMINOPHEN 5; 325 MG/1; MG/1
TABLET ORAL
COMMUNITY
Start: 2025-01-29 | End: 2025-04-02

## 2025-04-02 RX ORDER — ATORVASTATIN CALCIUM 20 MG/1
TABLET, FILM COATED ORAL
COMMUNITY
Start: 2025-03-24

## 2025-04-02 RX ORDER — CHLORHEXIDINE GLUCONATE ORAL RINSE 1.2 MG/ML
SOLUTION DENTAL
COMMUNITY
Start: 2025-02-17

## 2025-04-02 RX ORDER — AMLODIPINE BESYLATE 5 MG/1
5 TABLET ORAL DAILY
COMMUNITY
Start: 2025-01-24

## 2025-04-02 RX ORDER — MULTIVIT-MIN/IRON/FA/VIT K/LUT 8MG-400MCG
TABLET ORAL
COMMUNITY
Start: 2024-04-21

## 2025-04-02 RX ORDER — OMEPRAZOLE 20 MG/1
CAPSULE, DELAYED RELEASE ORAL
COMMUNITY
Start: 2024-06-30

## 2025-04-02 RX ORDER — QUETIAPINE FUMARATE 50 MG/1
TABLET, FILM COATED ORAL
COMMUNITY
Start: 2024-10-21 | End: 2025-04-02

## 2025-04-02 RX ORDER — ALBUTEROL SULFATE 90 UG/1
2 INHALANT RESPIRATORY (INHALATION) EVERY 4 HOURS PRN
COMMUNITY
Start: 2025-03-24

## 2025-04-02 RX ORDER — NEOMYCIN SULFATE, POLYMYXIN B SULFATE AND HYDROCORTISONE 10; 3.5; 1 MG/ML; MG/ML; [USP'U]/ML
SUSPENSION/ DROPS AURICULAR (OTIC)
COMMUNITY
Start: 2024-04-28 | End: 2025-04-02

## 2025-04-02 RX ORDER — HALOPERIDOL 0.5 MG/1
TABLET ORAL
COMMUNITY
Start: 2024-04-21 | End: 2025-04-02

## 2025-04-02 RX ORDER — BENZONATATE 200 MG/1
CAPSULE ORAL
COMMUNITY
Start: 2024-09-28 | End: 2025-04-02

## 2025-04-02 RX ORDER — DOXYCYCLINE 100 MG/1
CAPSULE ORAL
COMMUNITY
Start: 2024-09-28

## 2025-04-02 RX ORDER — PREDNISONE 20 MG/1
TABLET ORAL
COMMUNITY
Start: 2024-09-28 | End: 2025-04-02

## 2025-04-02 NOTE — PROGRESS NOTES
[facility]     Soham Mejia MD, FACP, MACG, FAASLD   MD Cesia Barksdale PA-C April S Ashworth, [facility]   Yoanna Thomson, [facility]-   Lucerojaspal Galeas, FNP-C  Ranjan Garvey, FNP-C   Siobhan Jack, [facility]   at [facility]   [address], [address]   [place]  [address] [phone]   FAX: [phone]  [facility]   [address], [address]   [place]  [address] [phone]   FAX: [phone]       Patient Care Team:  Angela Dumont MD as PCP - General  Yuriy Mcgee MD (Bariatrics)  Alexi Navarrete MD (Oncology)  Bernard Goncalves MD (General Surgery)      Patient Active Problem List   Diagnosis    Hypertension    Chronic back pain    Arthritis of both knees    GERD (gastroesophageal reflux disease)    Anemia    Heavy menses    Marijuana use    Smoking history    Anxiety    Sleep disorder    Diaphragmatic hernia without obstruction and without gangrene    Intestinal malabsorption    S/P laparoscopic sleeve gastrectomy    Dysphagia    Epigastric pain    IBS (irritable bowel syndrome)    Hypercholesterolemia    Migraine    Umbilical hernia    Gastroesophageal reflux disease    Nausea with vomiting    Difficulty in swallowing    Status post bariatric surgery         Stephanie Robertson returns to the [facility] today for education and management of SLD.      The active problem list, all pertinent past medical history, medications, liver histology, endoscopic studies, radiologic findings and laboratory findings related to the liver disorder were reviewed with the patient.     The patient is a 44 y.o. [place] female who was found to have steatotic liver disease (SLD) on liver biopsy performed during obesity surgery.      The patient underwent a sleeve gastrectomy in

## 2025-04-03 LAB
A/G RATIO: 1.5 RATIO (ref 1.1–2.6)
ALBUMIN: 4.3 G/DL (ref 3.5–5)
ALP BLD-CCNC: 119 U/L (ref 25–115)
ALT SERPL-CCNC: 34 U/L (ref 5–40)
ANION GAP SERPL CALCULATED.3IONS-SCNC: 9 MMOL/L (ref 3–15)
AST SERPL-CCNC: 24 U/L (ref 10–37)
BASOPHILS # BLD: 1 % (ref 0–2)
BASOPHILS ABSOLUTE: 0.1 K/UL (ref 0–0.2)
BILIRUB SERPL-MCNC: 0.3 MG/DL (ref 0.2–1.2)
BILIRUBIN DIRECT: <0.2 MG/DL (ref 0–0.3)
BUN BLDV-MCNC: 11 MG/DL (ref 6–22)
CALCIUM SERPL-MCNC: 9.4 MG/DL (ref 8.4–10.5)
CHLORIDE BLD-SCNC: 104 MMOL/L (ref 98–110)
CO2: 26 MMOL/L (ref 20–32)
CREAT SERPL-MCNC: 0.5 MG/DL (ref 0.5–1.2)
EOSINOPHIL # BLD: 2 % (ref 0–6)
EOSINOPHILS ABSOLUTE: 0.1 K/UL (ref 0–0.5)
GFR, ESTIMATED: >60 ML/MIN/1.73 SQ.M.
GLOBULIN: 2.8 G/DL (ref 2–4)
GLUCOSE: 75 MG/DL (ref 70–99)
HCT VFR BLD CALC: 40.6 % (ref 35.1–48)
HEMOGLOBIN: 11.6 G/DL (ref 11.7–16)
INR BLD: 0.93 (ref 0.93–1.29)
LYMPHOCYTES # BLD: 39 % (ref 20–45)
LYMPHOCYTES ABSOLUTE: 1.7 K/UL (ref 1–4.8)
MCH RBC QN AUTO: 25 PG (ref 26–34)
MCHC RBC AUTO-ENTMCNC: 29 G/DL (ref 31–36)
MCV RBC AUTO: 89 FL (ref 80–99)
MONOCYTES ABSOLUTE: 0.4 K/UL (ref 0.1–1)
MONOCYTES: 9 % (ref 3–12)
NEUTROPHILS ABSOLUTE: 2.2 K/UL (ref 1.8–7.7)
NEUTROPHILS SEGMENTED: 50 % (ref 40–75)
PDW BLD-RTO: 15.1 % (ref 10–15.5)
PLATELET # BLD: 245 K/UL (ref 140–440)
PMV BLD AUTO: 10 FL (ref 9–13)
POTASSIUM SERPL-SCNC: 3.9 MMOL/L (ref 3.5–5.5)
PROTHROMBIN TIME: 10.3 SEC (ref 9–13)
RBC # BLD: 4.56 M/UL (ref 3.8–5.2)
SODIUM BLD-SCNC: 139 MMOL/L (ref 133–145)
TOTAL PROTEIN: 7.1 G/DL (ref 6.4–8.3)
WBC # BLD: 4.3 K/UL (ref 4–11)

## 2025-04-08 LAB
AFP (ALPHA FETOPROTEIN) L3%: NORMAL % (ref 0.5–9.9)
AFP: 3.3 NG/ML (ref 1.6–4.5)

## 2025-04-10 ENCOUNTER — OFFICE VISIT (OUTPATIENT)
Age: 45
End: 2025-04-10
Payer: MEDICAID

## 2025-04-10 VITALS
DIASTOLIC BLOOD PRESSURE: 60 MMHG | SYSTOLIC BLOOD PRESSURE: 114 MMHG | BODY MASS INDEX: 30.66 KG/M2 | OXYGEN SATURATION: 100 % | TEMPERATURE: 97.1 F | WEIGHT: 184 LBS | HEIGHT: 65 IN | HEART RATE: 60 BPM

## 2025-04-10 DIAGNOSIS — K44.9 HIATAL HERNIA: ICD-10-CM

## 2025-04-10 DIAGNOSIS — K21.9 GASTROESOPHAGEAL REFLUX DISEASE, UNSPECIFIED WHETHER ESOPHAGITIS PRESENT: ICD-10-CM

## 2025-04-10 DIAGNOSIS — Z98.84 S/P LAPAROSCOPIC SLEEVE GASTRECTOMY: ICD-10-CM

## 2025-04-10 DIAGNOSIS — K42.9 UMBILICAL HERNIA WITHOUT OBSTRUCTION AND WITHOUT GANGRENE: ICD-10-CM

## 2025-04-10 DIAGNOSIS — K90.9 INTESTINAL MALABSORPTION, UNSPECIFIED TYPE: Primary | ICD-10-CM

## 2025-04-10 PROCEDURE — 3074F SYST BP LT 130 MM HG: CPT | Performed by: NURSE PRACTITIONER

## 2025-04-10 PROCEDURE — 99214 OFFICE O/P EST MOD 30 MIN: CPT | Performed by: NURSE PRACTITIONER

## 2025-04-10 PROCEDURE — 3078F DIAST BP <80 MM HG: CPT | Performed by: NURSE PRACTITIONER

## 2025-04-10 RX ORDER — FAMOTIDINE 20 MG/1
20 TABLET, FILM COATED ORAL 2 TIMES DAILY
Qty: 180 TABLET | Refills: 3 | Status: SHIPPED | OUTPATIENT
Start: 2025-04-10 | End: 2026-04-10

## 2025-04-10 NOTE — PATIENT INSTRUCTIONS
immediately after surgery: any complete chewable, such as: [place]’s Complete chewables.    Avoid [place] sours or gummies.  They lack iron and other important nutrients and also have added sugar.    Continue with chewable vitamin or change to adult complete multivitamin one month after surgery. Menstruating women can take a prenatal vitamin.    Make sure has at least 18 mg iron and 400-800 mcg folic acid   Vitamin B12, B Complex Vitamin, and Biotin  Start taking within a month after surgery.   Vitamin B12:  1000 mcg of Vitamin B12 three times weekly    Must take sublingually (meaning you take it under your tongue) or in a liquid drop form for easy absorption.      B Complex Vitamin: Take a pill or liquid drop form once daily.     Biotin: This vitamin can help prevent hair loss.    Recommend 5mg   (5000 mcg) a day  Biotin is Optional

## 2025-04-10 NOTE — PROGRESS NOTES
Subjective:     Stephanie Robertson  is a 44 y.o. female who presents for follow-up about 18 months following laparoscopic sleeve gastrectomy. She has lost a total of 60 pounds since surgery.  Body mass index is 30.62 kg/m².. EBWL is (56%). The patient presents today to assess their progress toward their goal of weight loss and to address any issues that may be present.  Today the patient and I have reviewed their diet and how appropriate their food choices are.  The following issues have been identified - none from a surgical standpoint.    Surgery related complication: NA       She reports reflux and fatigue. States reflux is so bad it causes her to vomit and comes through her nose. Has hiatal hernia seen on CT from August 2024. She is taking protonix BID and using carafate. She has not been taking pepcid. She denies nausea, abdominal pain, and difficulty swallowing. Is having pain at her hernia and scheduled for repair with Dr Goncalves.    The patients diet choices have been reviewed today and counseling was given.      Fluid intake: fair      Protein intake: 2 shakes + food; chicken, turkey, yogurt      Meals/day: 3    Patients pain score:0/10    The patient's exercise level: not active.    Changes in her medical history and medications have been reviewed.    Comorbidities:    Hypertension: N/A  Diabetes: N/A  Obstructive Sleep Apnea: N/A   Hyperlipidemia: N/A  Stress Urinary Incontinence: N/A  Gastroesophageal Reflux:improved, on PPI  Weight related arthropathy:unchanged    Patient Active Problem List   Diagnosis    Hypertension    Chronic back pain    Arthritis of both knees    GERD (gastroesophageal reflux disease)    Anemia    Heavy menses    Marijuana use    Smoking history    Anxiety    Sleep disorder    Diaphragmatic hernia without obstruction and without gangrene    Intestinal malabsorption    S/P laparoscopic sleeve gastrectomy    Dysphagia    Epigastric pain    IBS (irritable bowel syndrome)

## 2025-05-14 ENCOUNTER — ANESTHESIA EVENT (OUTPATIENT)
Facility: HOSPITAL | Age: 45
End: 2025-05-14
Payer: MEDICAID

## 2025-05-28 ENCOUNTER — ANESTHESIA (OUTPATIENT)
Facility: HOSPITAL | Age: 45
End: 2025-05-28
Payer: MEDICAID

## 2025-05-28 ENCOUNTER — HOSPITAL ENCOUNTER (OUTPATIENT)
Facility: HOSPITAL | Age: 45
Setting detail: OUTPATIENT SURGERY
Discharge: HOME | End: 2025-05-28
Attending: SURGERY | Admitting: SURGERY
Payer: MEDICAID

## 2025-05-28 VITALS
HEIGHT: 65 IN | WEIGHT: 181.5 LBS | DIASTOLIC BLOOD PRESSURE: 76 MMHG | RESPIRATION RATE: 17 BRPM | SYSTOLIC BLOOD PRESSURE: 138 MMHG | BODY MASS INDEX: 30.24 KG/M2 | TEMPERATURE: 97.2 F | HEART RATE: 71 BPM | OXYGEN SATURATION: 99 %

## 2025-05-28 DIAGNOSIS — K42.9 UMBILICAL HERNIA WITHOUT OBSTRUCTION AND WITHOUT GANGRENE: Primary | ICD-10-CM

## 2025-05-28 LAB — HCG UR QL: NEGATIVE

## 2025-05-28 PROCEDURE — C1781 MESH (IMPLANTABLE): HCPCS | Performed by: SURGERY

## 2025-05-28 PROCEDURE — 2500000003 HC RX 250 WO HCPCS: Performed by: SURGERY

## 2025-05-28 PROCEDURE — 7100000001 HC PACU RECOVERY - ADDTL 15 MIN: Performed by: SURGERY

## 2025-05-28 PROCEDURE — 3600000009 HC SURGERY ROBOT BASE: Performed by: SURGERY

## 2025-05-28 PROCEDURE — 6360000002 HC RX W HCPCS: Performed by: SURGERY

## 2025-05-28 PROCEDURE — 3700000001 HC ADD 15 MINUTES (ANESTHESIA): Performed by: SURGERY

## 2025-05-28 PROCEDURE — 7100000010 HC PHASE II RECOVERY - FIRST 15 MIN: Performed by: SURGERY

## 2025-05-28 PROCEDURE — 6360000002 HC RX W HCPCS: Performed by: ANESTHESIOLOGY

## 2025-05-28 PROCEDURE — 2709999900 HC NON-CHARGEABLE SUPPLY: Performed by: SURGERY

## 2025-05-28 PROCEDURE — 2580000003 HC RX 258: Performed by: SURGERY

## 2025-05-28 PROCEDURE — 6370000000 HC RX 637 (ALT 250 FOR IP): Performed by: NURSE ANESTHETIST, CERTIFIED REGISTERED

## 2025-05-28 PROCEDURE — 81025 URINE PREGNANCY TEST: CPT

## 2025-05-28 PROCEDURE — 2500000003 HC RX 250 WO HCPCS: Performed by: ANESTHESIOLOGY

## 2025-05-28 PROCEDURE — 3600000019 HC SURGERY ROBOT ADDTL 15MIN: Performed by: SURGERY

## 2025-05-28 PROCEDURE — 2500000003 HC RX 250 WO HCPCS: Performed by: NURSE ANESTHETIST, CERTIFIED REGISTERED

## 2025-05-28 PROCEDURE — 6360000002 HC RX W HCPCS: Performed by: NURSE ANESTHETIST, CERTIFIED REGISTERED

## 2025-05-28 PROCEDURE — 3700000000 HC ANESTHESIA ATTENDED CARE: Performed by: SURGERY

## 2025-05-28 PROCEDURE — 2720000010 HC SURG SUPPLY STERILE: Performed by: SURGERY

## 2025-05-28 PROCEDURE — S2900 ROBOTIC SURGICAL SYSTEM: HCPCS | Performed by: SURGERY

## 2025-05-28 PROCEDURE — 7100000011 HC PHASE II RECOVERY - ADDTL 15 MIN: Performed by: SURGERY

## 2025-05-28 PROCEDURE — 7100000000 HC PACU RECOVERY - FIRST 15 MIN: Performed by: SURGERY

## 2025-05-28 DEVICE — IMPLANTABLE DEVICE: Type: IMPLANTABLE DEVICE | Site: ABDOMEN | Status: FUNCTIONAL

## 2025-05-28 RX ORDER — DEXAMETHASONE SODIUM PHOSPHATE 4 MG/ML
INJECTION, SOLUTION INTRA-ARTICULAR; INTRALESIONAL; INTRAMUSCULAR; INTRAVENOUS; SOFT TISSUE
Status: DISCONTINUED | OUTPATIENT
Start: 2025-05-28 | End: 2025-05-28 | Stop reason: SDUPTHER

## 2025-05-28 RX ORDER — FENTANYL CITRATE 50 UG/ML
INJECTION, SOLUTION INTRAMUSCULAR; INTRAVENOUS
Status: DISCONTINUED | OUTPATIENT
Start: 2025-05-28 | End: 2025-05-28 | Stop reason: SDUPTHER

## 2025-05-28 RX ORDER — HYDROMORPHONE HYDROCHLORIDE 1 MG/ML
0.5 INJECTION, SOLUTION INTRAMUSCULAR; INTRAVENOUS; SUBCUTANEOUS EVERY 5 MIN PRN
Status: COMPLETED | OUTPATIENT
Start: 2025-05-28 | End: 2025-05-28

## 2025-05-28 RX ORDER — LIDOCAINE HYDROCHLORIDE 20 MG/ML
INJECTION, SOLUTION EPIDURAL; INFILTRATION; INTRACAUDAL; PERINEURAL
Status: DISCONTINUED | OUTPATIENT
Start: 2025-05-28 | End: 2025-05-28 | Stop reason: SDUPTHER

## 2025-05-28 RX ORDER — METOCLOPRAMIDE HYDROCHLORIDE 5 MG/ML
INJECTION INTRAMUSCULAR; INTRAVENOUS
Status: DISCONTINUED | OUTPATIENT
Start: 2025-05-28 | End: 2025-05-28 | Stop reason: SDUPTHER

## 2025-05-28 RX ORDER — MIDAZOLAM HYDROCHLORIDE 1 MG/ML
INJECTION, SOLUTION INTRAMUSCULAR; INTRAVENOUS
Status: DISCONTINUED | OUTPATIENT
Start: 2025-05-28 | End: 2025-05-28 | Stop reason: SDUPTHER

## 2025-05-28 RX ORDER — OXYCODONE HYDROCHLORIDE 5 MG/1
10 TABLET ORAL PRN
Status: DISCONTINUED | OUTPATIENT
Start: 2025-05-28 | End: 2025-05-28 | Stop reason: HOSPADM

## 2025-05-28 RX ORDER — ALBUTEROL SULFATE 90 UG/1
INHALANT RESPIRATORY (INHALATION)
Status: DISCONTINUED | OUTPATIENT
Start: 2025-05-28 | End: 2025-05-28 | Stop reason: SDUPTHER

## 2025-05-28 RX ORDER — OXYCODONE HYDROCHLORIDE 5 MG/1
5 TABLET ORAL EVERY 6 HOURS PRN
Qty: 12 TABLET | Refills: 0 | Status: SHIPPED | OUTPATIENT
Start: 2025-05-28 | End: 2025-05-31

## 2025-05-28 RX ORDER — GLYCOPYRROLATE 0.2 MG/ML
INJECTION INTRAMUSCULAR; INTRAVENOUS
Status: DISCONTINUED | OUTPATIENT
Start: 2025-05-28 | End: 2025-05-28 | Stop reason: SDUPTHER

## 2025-05-28 RX ORDER — CIPROFLOXACIN 2 MG/ML
400 INJECTION, SOLUTION INTRAVENOUS ONCE
Status: COMPLETED | OUTPATIENT
Start: 2025-05-28 | End: 2025-05-28

## 2025-05-28 RX ORDER — SODIUM CHLORIDE 0.9 % (FLUSH) 0.9 %
5-40 SYRINGE (ML) INJECTION PRN
Status: DISCONTINUED | OUTPATIENT
Start: 2025-05-28 | End: 2025-05-28 | Stop reason: HOSPADM

## 2025-05-28 RX ORDER — PROPOFOL 10 MG/ML
INJECTION, EMULSION INTRAVENOUS
Status: DISCONTINUED | OUTPATIENT
Start: 2025-05-28 | End: 2025-05-28 | Stop reason: SDUPTHER

## 2025-05-28 RX ORDER — MAGNESIUM HYDROXIDE 1200 MG/15ML
LIQUID ORAL CONTINUOUS PRN
Status: COMPLETED | OUTPATIENT
Start: 2025-05-28 | End: 2025-05-28

## 2025-05-28 RX ORDER — FENTANYL CITRATE 50 UG/ML
25 INJECTION, SOLUTION INTRAMUSCULAR; INTRAVENOUS EVERY 5 MIN PRN
Status: COMPLETED | OUTPATIENT
Start: 2025-05-28 | End: 2025-05-28

## 2025-05-28 RX ORDER — IPRATROPIUM BROMIDE AND ALBUTEROL SULFATE 2.5; .5 MG/3ML; MG/3ML
1 SOLUTION RESPIRATORY (INHALATION)
Status: DISCONTINUED | OUTPATIENT
Start: 2025-05-28 | End: 2025-05-28 | Stop reason: HOSPADM

## 2025-05-28 RX ORDER — PHENYLEPHRINE HCL IN 0.9% NACL 1 MG/10 ML
SYRINGE (ML) INTRAVENOUS
Status: DISCONTINUED | OUTPATIENT
Start: 2025-05-28 | End: 2025-05-28 | Stop reason: SDUPTHER

## 2025-05-28 RX ORDER — ROCURONIUM BROMIDE 10 MG/ML
INJECTION, SOLUTION INTRAVENOUS
Status: DISCONTINUED | OUTPATIENT
Start: 2025-05-28 | End: 2025-05-28 | Stop reason: SDUPTHER

## 2025-05-28 RX ORDER — SUCCINYLCHOLINE/SOD CL,ISO/PF 100 MG/5ML
SYRINGE (ML) INTRAVENOUS
Status: DISCONTINUED | OUTPATIENT
Start: 2025-05-28 | End: 2025-05-28 | Stop reason: SDUPTHER

## 2025-05-28 RX ORDER — SODIUM CHLORIDE, SODIUM LACTATE, POTASSIUM CHLORIDE, CALCIUM CHLORIDE 600; 310; 30; 20 MG/100ML; MG/100ML; MG/100ML; MG/100ML
INJECTION, SOLUTION INTRAVENOUS CONTINUOUS
Status: DISCONTINUED | OUTPATIENT
Start: 2025-05-28 | End: 2025-05-28 | Stop reason: HOSPADM

## 2025-05-28 RX ORDER — DEXMEDETOMIDINE HYDROCHLORIDE 100 UG/ML
INJECTION, SOLUTION INTRAVENOUS
Status: DISCONTINUED | OUTPATIENT
Start: 2025-05-28 | End: 2025-05-28 | Stop reason: SDUPTHER

## 2025-05-28 RX ORDER — SODIUM CHLORIDE 9 MG/ML
INJECTION, SOLUTION INTRAVENOUS PRN
Status: DISCONTINUED | OUTPATIENT
Start: 2025-05-28 | End: 2025-05-28 | Stop reason: HOSPADM

## 2025-05-28 RX ORDER — SODIUM CHLORIDE 0.9 % (FLUSH) 0.9 %
5-40 SYRINGE (ML) INJECTION EVERY 12 HOURS SCHEDULED
Status: DISCONTINUED | OUTPATIENT
Start: 2025-05-28 | End: 2025-05-28 | Stop reason: HOSPADM

## 2025-05-28 RX ORDER — OXYCODONE HYDROCHLORIDE 5 MG/1
5 TABLET ORAL PRN
Status: DISCONTINUED | OUTPATIENT
Start: 2025-05-28 | End: 2025-05-28 | Stop reason: HOSPADM

## 2025-05-28 RX ORDER — NALOXONE HYDROCHLORIDE 0.4 MG/ML
INJECTION, SOLUTION INTRAMUSCULAR; INTRAVENOUS; SUBCUTANEOUS PRN
Status: DISCONTINUED | OUTPATIENT
Start: 2025-05-28 | End: 2025-05-28 | Stop reason: HOSPADM

## 2025-05-28 RX ORDER — PROCHLORPERAZINE EDISYLATE 5 MG/ML
5 INJECTION INTRAMUSCULAR; INTRAVENOUS
Status: DISCONTINUED | OUTPATIENT
Start: 2025-05-28 | End: 2025-05-28 | Stop reason: HOSPADM

## 2025-05-28 RX ORDER — ONDANSETRON 2 MG/ML
INJECTION INTRAMUSCULAR; INTRAVENOUS
Status: DISCONTINUED | OUTPATIENT
Start: 2025-05-28 | End: 2025-05-28 | Stop reason: SDUPTHER

## 2025-05-28 RX ORDER — BUPIVACAINE HYDROCHLORIDE 2.5 MG/ML
INJECTION, SOLUTION EPIDURAL; INFILTRATION; INTRACAUDAL; PERINEURAL PRN
Status: DISCONTINUED | OUTPATIENT
Start: 2025-05-28 | End: 2025-05-28 | Stop reason: ALTCHOICE

## 2025-05-28 RX ADMIN — HYDROMORPHONE HYDROCHLORIDE 0.5 MG: 1 INJECTION, SOLUTION INTRAMUSCULAR; INTRAVENOUS; SUBCUTANEOUS at 14:28

## 2025-05-28 RX ADMIN — LIDOCAINE HYDROCHLORIDE 100 MG: 20 INJECTION, SOLUTION EPIDURAL; INFILTRATION; INTRACAUDAL; PERINEURAL at 12:57

## 2025-05-28 RX ADMIN — MIDAZOLAM 2 MG: 1 INJECTION INTRAMUSCULAR; INTRAVENOUS at 12:48

## 2025-05-28 RX ADMIN — SUGAMMADEX 170 MG: 100 INJECTION, SOLUTION INTRAVENOUS at 13:58

## 2025-05-28 RX ADMIN — Medication 160 MG: at 12:57

## 2025-05-28 RX ADMIN — FENTANYL CITRATE 100 MCG: 50 INJECTION INTRAMUSCULAR; INTRAVENOUS at 12:52

## 2025-05-28 RX ADMIN — DEXMEDETOMIDINE HYDROCHLORIDE 3 MCG: 100 INJECTION, SOLUTION INTRAVENOUS at 13:50

## 2025-05-28 RX ADMIN — ROCURONIUM BROMIDE 10 MG: 10 INJECTION, SOLUTION INTRAVENOUS at 12:57

## 2025-05-28 RX ADMIN — PROPOFOL 50 MG: 10 INJECTION, EMULSION INTRAVENOUS at 14:03

## 2025-05-28 RX ADMIN — GLYCOPYRROLATE 0.2 MG: 0.2 INJECTION INTRAMUSCULAR; INTRAVENOUS at 12:52

## 2025-05-28 RX ADMIN — FENTANYL CITRATE 25 MCG: 50 INJECTION, SOLUTION INTRAMUSCULAR; INTRAVENOUS at 14:54

## 2025-05-28 RX ADMIN — DEXMEDETOMIDINE HYDROCHLORIDE 3 MCG: 100 INJECTION, SOLUTION INTRAVENOUS at 14:03

## 2025-05-28 RX ADMIN — SODIUM CHLORIDE, SODIUM LACTATE, POTASSIUM CHLORIDE, AND CALCIUM CHLORIDE: 600; 310; 30; 20 INJECTION, SOLUTION INTRAVENOUS at 12:48

## 2025-05-28 RX ADMIN — PROPOFOL 200 MG: 10 INJECTION, EMULSION INTRAVENOUS at 12:57

## 2025-05-28 RX ADMIN — ROCURONIUM BROMIDE 20 MG: 10 INJECTION, SOLUTION INTRAVENOUS at 13:05

## 2025-05-28 RX ADMIN — ALBUTEROL SULFATE 3 PUFF: 90 AEROSOL, METERED RESPIRATORY (INHALATION) at 14:05

## 2025-05-28 RX ADMIN — HYDROMORPHONE HYDROCHLORIDE 0.5 MG: 1 INJECTION, SOLUTION INTRAMUSCULAR; INTRAVENOUS; SUBCUTANEOUS at 14:22

## 2025-05-28 RX ADMIN — HYDROMORPHONE HYDROCHLORIDE 0.5 MG: 1 INJECTION, SOLUTION INTRAMUSCULAR; INTRAVENOUS; SUBCUTANEOUS at 13:46

## 2025-05-28 RX ADMIN — HYDROMORPHONE HYDROCHLORIDE 0.5 MG: 1 INJECTION, SOLUTION INTRAMUSCULAR; INTRAVENOUS; SUBCUTANEOUS at 13:29

## 2025-05-28 RX ADMIN — Medication 100 MCG: at 13:21

## 2025-05-28 RX ADMIN — SODIUM CHLORIDE, SODIUM LACTATE, POTASSIUM CHLORIDE, AND CALCIUM CHLORIDE: 600; 310; 30; 20 INJECTION, SOLUTION INTRAVENOUS at 13:31

## 2025-05-28 RX ADMIN — FENTANYL CITRATE 25 MCG: 50 INJECTION, SOLUTION INTRAMUSCULAR; INTRAVENOUS at 14:45

## 2025-05-28 RX ADMIN — PROPOFOL 50 MG: 10 INJECTION, EMULSION INTRAVENOUS at 13:16

## 2025-05-28 RX ADMIN — ONDANSETRON 4 MG: 2 INJECTION, SOLUTION INTRAMUSCULAR; INTRAVENOUS at 13:50

## 2025-05-28 RX ADMIN — CIPROFLOXACIN 400 MG: 2 INJECTION, SOLUTION INTRAVENOUS at 13:06

## 2025-05-28 RX ADMIN — ROCURONIUM BROMIDE 20 MG: 10 INJECTION, SOLUTION INTRAVENOUS at 13:16

## 2025-05-28 RX ADMIN — METOCLOPRAMIDE 10 MG: 5 INJECTION, SOLUTION INTRAMUSCULAR; INTRAVENOUS at 13:35

## 2025-05-28 RX ADMIN — DEXAMETHASONE SODIUM PHOSPHATE 4 MG: 4 INJECTION, SOLUTION INTRAMUSCULAR; INTRAVENOUS at 13:15

## 2025-05-28 ASSESSMENT — PAIN DESCRIPTION - FREQUENCY
FREQUENCY: CONTINUOUS

## 2025-05-28 ASSESSMENT — PAIN DESCRIPTION - DESCRIPTORS
DESCRIPTORS: DULL
DESCRIPTORS: ACHING
DESCRIPTORS: DULL
DESCRIPTORS: DULL
DESCRIPTORS: ACHING

## 2025-05-28 ASSESSMENT — PAIN - FUNCTIONAL ASSESSMENT
PAIN_FUNCTIONAL_ASSESSMENT: 0-10
PAIN_FUNCTIONAL_ASSESSMENT: ACTIVITIES ARE NOT PREVENTED

## 2025-05-28 ASSESSMENT — PAIN DESCRIPTION - ONSET
ONSET: ON-GOING

## 2025-05-28 ASSESSMENT — COPD QUESTIONNAIRES: CAT_SEVERITY: MILD

## 2025-05-28 ASSESSMENT — PAIN DESCRIPTION - PAIN TYPE
TYPE: ACUTE PAIN;SURGICAL PAIN
TYPE: SURGICAL PAIN
TYPE: ACUTE PAIN;SURGICAL PAIN

## 2025-05-28 ASSESSMENT — PAIN SCALES - GENERAL
PAINLEVEL_OUTOF10: 10
PAINLEVEL_OUTOF10: 3
PAINLEVEL_OUTOF10: 6
PAINLEVEL_OUTOF10: 3
PAINLEVEL_OUTOF10: 3
PAINLEVEL_OUTOF10: 6
PAINLEVEL_OUTOF10: 0
PAINLEVEL_OUTOF10: 0
PAINLEVEL_OUTOF10: 8
PAINLEVEL_OUTOF10: 0
PAINLEVEL_OUTOF10: 3

## 2025-05-28 ASSESSMENT — PAIN DESCRIPTION - ORIENTATION
ORIENTATION: MID

## 2025-05-28 ASSESSMENT — PAIN DESCRIPTION - LOCATION
LOCATION: ABDOMEN

## 2025-05-28 ASSESSMENT — LIFESTYLE VARIABLES: SMOKING_STATUS: 1

## 2025-05-28 NOTE — BRIEF OP NOTE
Brief Postoperative Note      Patient: Stephanie Robertson  YOB: 1980  MRN: [id number]    Date of Procedure: 5/28/2025    Pre-Op Diagnosis Codes:      * Ventral hernia without obstruction or gangrene [K43.9]    Post-Op Diagnosis: Same       Procedure(s):  ROBOTIC REPAIR INCISIONAL RECURRENT VENTRAL HERNIA, MYOFASCIAL RELEASE \"SPEC POP\"    Surgeon(s):  Bernard Goncalves MD    Assistant:  Surgical Assistant: Shavon Jennings    Anesthesia: General    Estimated Blood Loss (mL): Minimal    Complications: None    Specimens:   * No specimens in log *    Implants:  Implant Name Type Inv. Item Serial No.  Lot No. LRB No. Used Action   MESH HIRA RND 15 CM COMP PARIETENE DS - [id number]  MESH HIRA RND 15 CM COMP PARIETENE DS  [employer] COVIDIEN  SURGICAL-WD [id number] N/A 1 Implanted         Drains: * No LDAs found *    Findings:  Infection Present At Time Of Surgery (PATOS) (choose all levels that have infection present):  No infection present  Other Findings: none    Electronically signed by Bernard Goncalves MD on 5/28/2025 at 1:56 PM

## 2025-05-28 NOTE — PERIOP NOTE
TRANSFER - IN REPORT:    Verbal report received from PRISCILLA Ashby on Stephanie Robertson  being received from PACU for routine progression of patient care      Report consisted of patient's Situation, Background, Assessment and   Recommendations(SBAR).     Information from the following report(s) Nurse Handoff Report, Intake/Output, and MAR was reviewed with the receiving nurse.    Opportunity for questions and clarification was provided.      Assessment completed upon patient's arrival to unit and care assumed.

## 2025-05-28 NOTE — PERIOP NOTE
TRANSFER - OUT REPORT:    Verbal report given to Heron WELLS  on Stephanie Robertson  being transferred to Phase II  for routine progression of patient care       Report consisted of patient's Situation, Background, Assessment and   Recommendations(SBAR).     Information from the following report(s) Adult Overview, Surgery Report, Intake/Output, and MAR was reviewed with the receiving nurse.           Lines:   Peripheral IV 05/28/25 Posterior;Right Hand (Active)   Site Assessment Clean, dry & intact 05/28/25 1445   Line Status Infusing 05/28/25 1445   Line Care Connections checked and tightened 05/28/25 1424   Phlebitis Assessment No symptoms 05/28/25 1445   Infiltration Assessment 0 05/28/25 1445   Dressing Status Clean, dry & intact 05/28/25 1445   Dressing Type Transparent 05/28/25 1445   Dressing Intervention New 05/28/25 1015       Peripheral IV 05/28/25 Left;Posterior Hand (Active)   Site Assessment Clean, dry & intact 05/28/25 1445   Line Status Normal saline locked 05/28/25 1445   Line Care Connections checked and tightened 05/28/25 1024   Phlebitis Assessment No symptoms 05/28/25 1445   Infiltration Assessment 0 05/28/25 1445   Alcohol Cap Used Yes 05/28/25 1445   Dressing Status Clean, dry & intact 05/28/25 1445   Dressing Type Transparent 05/28/25 1445   Dressing Intervention New 05/28/25 1024        Opportunity for questions and clarification was provided.      Patient transported with:  Registered Nurse

## 2025-05-28 NOTE — ANESTHESIA PRE PROCEDURE
Department of Anesthesiology  Preprocedure Note       Name:  Stephanie Robertson   Age:  44 y.o.  :  1980                                          MRN:  [id number]         Date:  2025      Surgeon: Surgeon(s):  Bernard Goncalves MD    Procedure: Procedure(s):  ROBOTIC REPAIR INCISIONAL RECURRENT VENTRAL HERNIA, MYOFASCIAL RELEASE \"SPEC POP\"    Medications prior to admission:   Prior to Admission medications    Medication Sig Start Date End Date Taking? Authorizing Provider   famotidine (PEPCID) 20 MG tablet Take 1 tablet by mouth 2 times daily  Patient taking differently: Take 1 tablet by mouth 2 times daily Indications: GERD 4/10/25 4/10/26 Yes Tamara Mccollum APRN - NP   albuterol sulfate HFA (PROVENTIL;VENTOLIN;PROAIR) 108 (90 Base) MCG/ACT inhaler Inhale 2 puffs into the lungs every 4 hours as needed for Wheezing 3/24/25  Yes Provider, Historical, MD   aluminum & magnesium hydroxide-simethicone ([employer] ANTACID/ANTI-GAS) 400-400-40 MG/5ML SUSP  24  Yes Provider, Historical, MD   amLODIPine (NORVASC) 5 MG tablet Take 1 tablet by mouth daily 25  Yes Provider, Historical, MD   atorvastatin (LIPITOR) 20 MG tablet  3/24/25  Yes Provider, Historical, MD   doxycycline monohydrate (MONODOX) 100 MG capsule  24  Yes Provider, Historical, MD   omeprazole (PRILOSEC) 20 MG delayed release capsule  24  Yes Provider, Historical, MD   metoclopramide (REGLAN) 10 MG tablet Take 1 tablet by mouth 4 times daily 24  Yes Tamara Mccollum APRN - NP   clobetasol (TEMOVATE) 0.05 % external solution APPLY THIN FILM TO AFFECTED AREA ON SCALP ONCE OR TWICE DAILY FOR ALOPECIA 10/4/24  Yes Provider, Historical, MD   Multiple Vitamins-Minerals (THERAPEUTIC MULTIVITAMIN-MINERALS) tablet Take 1 tablet by mouth daily   Yes Provider, Historical, MD   pantoprazole (PROTONIX) 40 MG tablet Take 1 tablet by mouth 2 times daily 10/25/24  Yes Tamara Mccollum APRN - NP   dutasteride (AVODART) 0.5 MG capsule TAKE 1 TABLET BY

## 2025-05-28 NOTE — H&P
This 44 year old patient presents for History and Physical ahead of robotic ventral hernia repair on 05/28/2025    History of Present Illness  1.  H&P   Patient presents in preparation for hernia repair tomorrow. All clearances have been obtained and plavix has been stopped. No new health concerns since last physical office visit. Voices no concerns at this time.      Active Medications (Prior to visit today)  Medication Name  Sig Description  Start Date  Stop Date  Refilled  Rx Elsewhere  amitriptyline HCl    09/01/2020      Y  famotidine  infuse by intravenous route  every 12 hours  09/01/2020      Y  pregabalin    09/01/2020      Y  Ventolin HFA 90 mcg/actuation aerosol inhaler  INHALE 2 PUFFS EVERY 4 (FOUR) HOURS AS NEEDED (COUGH, WHEEZING, SHORTNESS OF BREATH)  04/07/2020      Y  All Day Allergy (cetirizine) 10 mg tablet  Take 1 tablet (10 mg total) by mouth daily  01/02/2020      Y  Cymbalta 30 mg capsule,delayed release  Take 1 capsule (30 mg total) by mouth daily for 14 days  03/05/2020      Y  Flonase Allergy Relief 50 mcg/actuation nasal spray,suspension  Administer 1 spray into each nostril daily  01/02/2020      Y  lidocaine-aloe vera 0.5 % topical gel  Apply 1 application topically 3 (three) times a day as needed (burning ,pain)  01/02/2020      Y  Latuda 20 mg tablet  TAKE 1 TABLET BY MOUTH EVERYDAY AT BEDTIME  05/26/2020      Y  methocarbamol 500 mg tablet  Take 1 tablet (500 mg total) by mouth 4 (four) times a day  01/02/2020      Y  Dulera 100 mcg-5 mcg/actuation HFA aerosol inhaler  TAKE 2 PUFFS BY MOUTH TWICE A DAY  05/05/2020      Y  Singulair 10 mg tablet  Take 1 tab po daily.  01/02/2020      Y  promethazine 25 mg tablet  Take 1 tablet (25 mg total) by mouth every 8 (eight) hours as needed for nausea  01/02/2020      Y  topiramate 50 mg tablet  Take 1 tablet (50 mg total) by mouth 2 (two) times a day  07/10/2020      Y  trazodone 50 mg tablet  Take 1 tablet (50 mg total) by mouth at bedtime as

## 2025-05-28 NOTE — PERIOP NOTE
Dr Goncalves aware of patient having UTI and that she is being treated. No orders given. Okay to proceed.

## 2025-05-28 NOTE — ANESTHESIA POSTPROCEDURE EVALUATION
Department of Anesthesiology  Postprocedure Note    Patient: Stephanie Robertson  MRN: [id number]  YOB: 1980  Date of evaluation: 5/28/2025    Procedure Summary       Date: 05/28/25 Room / Location: [facility] MAIN 04 / [facility] MAIN OR    Anesthesia Start: 1248 Anesthesia Stop: 1421    Procedure: ROBOTIC REPAIR INCISIONAL RECURRENT VENTRAL HERNIA, MYOFASCIAL RELEASE \"SPEC POP\" (Abdomen) Diagnosis:       Ventral hernia without obstruction or gangrene      (Ventral hernia without obstruction or gangrene [K43.9])    Surgeons: Bernard Goncalves MD Responsible Provider: Amadeo Butts MD    Anesthesia Type: General ASA Status: 3            Anesthesia Type: General    Poly Phase I: Poly Score: 10    Poly Phase II: Poly Score: 10    Anesthesia Post Evaluation    Patient location during evaluation: PACU  Patient participation: complete - patient participated  Level of consciousness: awake and alert  Pain score: 0  Airway patency: patent  Nausea & Vomiting: no nausea and no vomiting  Cardiovascular status: blood pressure returned to baseline  Respiratory status: acceptable  Hydration status: euvolemic  Multimodal analgesia pain management approach  Pain management: adequate    No notable events documented.

## 2025-05-28 NOTE — DISCHARGE INSTRUCTIONS
Post-Operative Discharge Instructions  Bernard Goncalves M.D.  [address], [address], [place] [address] [phone]    Patient: Stephanie Robertson MRN: [id number]  [facility]: [id number]    YOB: 1980  Age: 44 y.o.  Sex: female    DOA: 5/28/2025 LOS: [unfilled]  Discharge Date: [unfilled]     Acute Diagnoses:  Ventral hernia without obstruction or gangrene [K43.9]    Chronic Medical Diagnoses:  [unfilled]    Diet  Resume prior to surgery diet as tolerated.    Activity  Do not drive a car or operate any hazardous machinery the day of surgery.  Rest quietly today.  No bending or heavy lifting.  You may resume other prior to surgery activities as tolerated.  You may remove the bandage and shower in 1 day.    Drain / Wound Care  Follow all drain / wound care instructions exactly as explained by the Nurse at time of discharge.  Apply an ice pack to the surgical site for 48 hours.  Do not put any salves or ointments on the wound.  Allow it to form a dry scab.  Leave steri-strips / Dermabond alone.  They should be allowed to fall off on their own in 7-14 days.    Medications  It is important to take your medications exactly as they are prescribed.  Keep your medication in the bottles provided by the pharmacist, and keep a list of the medication names, dosages, and times they should be taken in your wallet.    Call 911 anytime you think you may need emergency care. For example, call if:  You passed out (lost consciousness).  You have severe trouble breathing.  You have sudden chest pain and shortness of breath.    Notify your Surgeon for any of the following:  Fever, chills, nausea, vomiting, severe abdominal pain or bleeding.  If you experience any redness or discharge or sign of infection.  Persistent nausea lasting more than 24 hours.    If you are unable to reach your Surgeon for any of the symptoms above, you should proceed directly to the nearest Emergency Department.    Post-Operative Appointment

## 2025-05-28 NOTE — OP NOTE
Stephanie Robertson  [id number]  1980  [id number]  5/28/2025 5/28/2025       PREOPERATIVE DIAGNOSIS: Ventral hernia without obstruction or gangrene [K43.9]     POST-OPERATOVE DIAGNOSIS:   Recurrent ventral hernia    PROCEDURES PERFORMED: Procedure(s):  ROBOTIC REPAIR INCISIONAL RECURRENT VENTRAL HERNIA, MYOFASCIAL RELEASE \"SPEC POP\" 11 cm    SURGEON: Bernard Goncalves MD    ANESTHESIA: General     Assistant:  Surgical Assistant: Shavon Jennings Scrub: Eusebia Kwon RN  Scrub Person First: Arleth Packer    Implants:  Implant Name Type Inv. Item Serial No.  Lot No. LRB No. Used Action   MESH HIRA RND 15 CM COMP PARIETENE DS - [id number]  MESH HIRA RND 15 CM COMP PARIETENE DS  [employer] Plain VanillaIDIEN  SURGICAL-WD [id number] N/A 1 Implanted       ESTIMATED BLOOD LOSS: min    SPECIMENS: * No specimens in log *        INDICATIONS: This is a female who presents with ventral hernia. I have discussed the risks, benefits and alternatives.  I have discussed the procedure, use of mesh, recurrence, bowel, organ injury, risk of anesthesia, chronic pain, myofascial release.  The understand and want to proceed.          DESCRIPTION OF PROCEDURE: The patient was placed in the supine position.  The abdomen was prepped and draped in the usual fashion.  A Veress needle was inserted in the left upper quadrant at [place] point using one-drop technique and pneumoperitoneum was established.  An 8 mm robotic port was placed on the left side of the abdominal wall under visualization.  An 8 mm  port was placed in the epigastrium as a working port.  Two 8 mm robotic ports were placed, one in the left upper quadrant laterally and one in the left lower quadrant laterally.  The robot was docked in a routine fashion.  The abdomen was inspected and there were no other abnormalities noted.  The patient was found to have a ventral hernia.  The patient had 2 hernias 1 at the umbilicus and also 1 above the umbilicus which appeared to

## 2025-05-28 NOTE — PERIOP NOTE
TRANSFER - IN REPORT:    Verbal report received from Jean Ryan CRNA RN  on Stephanie Robertson  being received from OR for routine progression of patient care      Report consisted of patient's Situation, Background, Assessment and   Recommendations(SBAR).     Information from the following report(s) Adult Overview, Surgery Report, Intake/Output, and MAR was reviewed with the receiving nurse.    Opportunity for questions and clarification was provided.      Assessment completed upon patient's arrival to unit and care assumed.

## 2025-05-28 NOTE — PERIOP NOTE
Patient pain mild/moderate, patient in room air,   Intake/Output Summary (Last 24 hours) at 5/28/2025 1528  Last data filed at 5/28/2025 1512  Gross per 24 hour   Intake 2000 ml   Output 310 ml   Net 1690 ml     /64   Pulse 67   Temp 97.6 °F (36.4 °C) (Temporal)   Resp 14   Ht 1.651 m (5' 5\")   Wt 82.3 kg (181 lb 8 oz)   LMP  (LMP Unknown) Comment: irregular due to depo shots  SpO2 99%   BMI 30.20 kg/m²     Paged DR. Butts for sign out.

## 2025-07-09 RX ORDER — METOCLOPRAMIDE 10 MG/1
10 TABLET ORAL 4 TIMES DAILY
Qty: 90 TABLET | Refills: 2 | Status: SHIPPED | OUTPATIENT
Start: 2025-07-09

## (undated) DEVICE — FORCEPS BX OVL CUP SERR DISP CAP L 240CM RAD JAW 4

## (undated) DEVICE — GARMENT,MEDLINE,DVT,INT,CALF,MED, GEN2: Brand: MEDLINE

## (undated) DEVICE — SYSTEM POS SZ 36 X 20 X 1 IN INTEGR ADJ ARM PROTECTOR LIFT

## (undated) DEVICE — LAP CHOLE: Brand: MEDLINE INDUSTRIES, INC.

## (undated) DEVICE — SOLUTION IV LACTATED RINGERS INJECTION USP

## (undated) DEVICE — RELOAD STPL H1.8-3.8MM REG THCK TISS G 6 ROW GRIPPING SURF

## (undated) DEVICE — SUTURE MONOCRYL SZ 4-0 L18IN ABSRB UD L19MM PS-2 3/8 CIR PRIM Y496G

## (undated) DEVICE — SYRINGE MED 10ML LUERLOCK TIP W/O SFTY DISP

## (undated) DEVICE — TUBING, SUCTION, 1/4" X 12', STRAIGHT: Brand: MEDLINE

## (undated) DEVICE — GLOVE ORANGE PI 7 1/2   MSG9075

## (undated) DEVICE — SUTURE MCRYL + SZ 4-0 L27IN ABSRB UD L19MM PS-2 3/8 CIR MCP426H

## (undated) DEVICE — STAPLER 60MM POWERED ECHELON 3000 LONG 440MM

## (undated) DEVICE — STAPLER SKIN LN REINF 60 MM ECHELON ENDOPATH

## (undated) DEVICE — MOUTHPIECE ENDOSCP L CTRL OPN AND SIDE PORTS DISP

## (undated) DEVICE — VISIGI 3D®  CALIBRATION SYSTEM  SIZE 36FR STD W/ BULB: Brand: BOEHRINGER® VISIGI 3D™ SLEEVE GASTRECTOMY CALIBRATION SYSTEM, SIZE 36FR W/BULB

## (undated) DEVICE — BLADELESS OBTURATOR: Brand: WECK VISTA

## (undated) DEVICE — DRAPE C ARM UNIV W41XL74IN CLR PLAS XR VELC CLSR POLY STRP

## (undated) DEVICE — SEALANT TISS 10 CC FOR HUM FIBRIN VISTASEAL

## (undated) DEVICE — RELOAD STPL H4.1X2MM DIA60MM THCK TISS GRN 6 ROW PWR GST B

## (undated) DEVICE — BARIATRIC: Brand: MEDLINE INDUSTRIES, INC.

## (undated) DEVICE — ENDOSCOPY PUMP TUBING/ CAP SET: Brand: ERBE

## (undated) DEVICE — SHEARS LAP L45CM DIA5MM ULTRASONIC CRV TIP ADV HEMSTAS HARM

## (undated) DEVICE — GLOVE SURG SZ 8 L12IN FNGR THK79MIL GRN LTX FREE

## (undated) DEVICE — SUTURE ETHLN SZ 3-0 L30IN NONABSORBABLE BLK FSL L30MM 3/8 1671H

## (undated) DEVICE — ELECTRODE PT RET AD L9FT HI MOIST COND ADH HYDRGEL CORDED

## (undated) DEVICE — LIQUIBAND RAPID ADHESIVE 36/CS 0.8ML: Brand: MEDLINE

## (undated) DEVICE — COLUMN DRAPE

## (undated) DEVICE — SYRINGE,TOOMEY,IRRIGATION,70CC,STERILE: Brand: MEDLINE

## (undated) DEVICE — SUTURE ETHIB EXCL BR GRN TAPR PT 2-0 30 X563H X563H

## (undated) DEVICE — TROCAR LAP L100MM DIA5MM BLDELSS W/ STBL SL ENDOPATH XCEL

## (undated) DEVICE — SOLUTION SURG PREP 26 CC PURPREP

## (undated) DEVICE — TUBE SET WITH SMOKE EVACUATION

## (undated) DEVICE — INSUFFLATION NEEDLE TO ESTABLISH PNEUMOPERITONEUM.: Brand: INSUFFLATION NEEDLE

## (undated) DEVICE — Device

## (undated) DEVICE — SUTURE PDS II SZ 2-0 L27IN ABSRB VLT L26MM CT-2 1/2 CIR Z333H

## (undated) DEVICE — SET TBNG DISP TIP FOR AHTO

## (undated) DEVICE — ARM DRAPE

## (undated) DEVICE — SUTURE DEV SZ 2-0 WND CLSR ABSRB GS-22 VLOC COVIDIEN VLOCM2145

## (undated) DEVICE — BINDER ABD SM MED 30 IN - 45 IN HT 12 IN

## (undated) DEVICE — TROCAR ENDOSCP L100MM DIA12MM STBL SL BLDELSS ENDOPATH XCEL

## (undated) DEVICE — TIP COVER ACCESSORY

## (undated) DEVICE — APPLIER CLP L SHFT DIA12MM 20 ROT MULT LIGACLP

## (undated) DEVICE — MASK O2 O2 LN L7FT CO2 LN L10FT FEM BG 750ML M CONC ADPT

## (undated) DEVICE — DEVICE CLSR TRCR PRT

## (undated) DEVICE — SOLUTION IRRIG 500ML 0.9% SOD CHLO USP POUR PLAS BTL

## (undated) DEVICE — SUTURE ABSORBABLE MONOFILAMENT 0 GS22 9 IN GRN V-LOC 180 VLOCL2246

## (undated) DEVICE — SEAL

## (undated) DEVICE — TROCAR ENDOSCP BLDELSS 12X100 MM W/ HNDL STBL SL OPT TIP

## (undated) DEVICE — RELOAD STPL L60MM H2.3-4.2MM VERY THCK TISS BLK 6 ROW

## (undated) DEVICE — KIT SUTURING DEVICE M-CLOSE

## (undated) DEVICE — STRIP SKIN CLSR W1XL5IN NYLON REINF CURAD STERIL

## (undated) DEVICE — APPLICATOR LAP 35 CM 2 RIGID VISTASEAL

## (undated) DEVICE — SUTURE PDS II SZ 0 L27IN ABSRB VLT L26MM CT-2 1/2 CIR Z334H

## (undated) DEVICE — SLEEVE TRCR L100MM DIA5MM UNIV STBL FOR BLDELSS DIL TIP